# Patient Record
Sex: FEMALE | Race: WHITE | NOT HISPANIC OR LATINO | Employment: OTHER | URBAN - METROPOLITAN AREA
[De-identification: names, ages, dates, MRNs, and addresses within clinical notes are randomized per-mention and may not be internally consistent; named-entity substitution may affect disease eponyms.]

---

## 2017-07-03 ENCOUNTER — IMPORTED ENCOUNTER (OUTPATIENT)
Dept: URBAN - METROPOLITAN AREA CLINIC 50 | Facility: CLINIC | Age: 72
End: 2017-07-03

## 2017-08-23 ENCOUNTER — IMPORTED ENCOUNTER (OUTPATIENT)
Dept: URBAN - METROPOLITAN AREA CLINIC 50 | Facility: CLINIC | Age: 72
End: 2017-08-23

## 2017-09-01 ENCOUNTER — IMPORTED ENCOUNTER (OUTPATIENT)
Dept: URBAN - METROPOLITAN AREA CLINIC 50 | Facility: CLINIC | Age: 72
End: 2017-09-01

## 2017-09-08 ENCOUNTER — IMPORTED ENCOUNTER (OUTPATIENT)
Dept: URBAN - METROPOLITAN AREA CLINIC 50 | Facility: CLINIC | Age: 72
End: 2017-09-08

## 2017-09-19 ENCOUNTER — IMPORTED ENCOUNTER (OUTPATIENT)
Dept: URBAN - METROPOLITAN AREA CLINIC 50 | Facility: CLINIC | Age: 72
End: 2017-09-19

## 2017-11-15 ENCOUNTER — IMPORTED ENCOUNTER (OUTPATIENT)
Dept: URBAN - METROPOLITAN AREA CLINIC 50 | Facility: CLINIC | Age: 72
End: 2017-11-15

## 2017-12-19 ENCOUNTER — IMPORTED ENCOUNTER (OUTPATIENT)
Dept: URBAN - METROPOLITAN AREA CLINIC 50 | Facility: CLINIC | Age: 72
End: 2017-12-19

## 2018-08-06 ENCOUNTER — IMPORTED ENCOUNTER (OUTPATIENT)
Dept: URBAN - METROPOLITAN AREA CLINIC 50 | Facility: CLINIC | Age: 73
End: 2018-08-06

## 2018-08-28 ENCOUNTER — IMPORTED ENCOUNTER (OUTPATIENT)
Dept: URBAN - METROPOLITAN AREA CLINIC 50 | Facility: CLINIC | Age: 73
End: 2018-08-28

## 2018-10-11 ENCOUNTER — IMPORTED ENCOUNTER (OUTPATIENT)
Dept: URBAN - METROPOLITAN AREA CLINIC 50 | Facility: CLINIC | Age: 73
End: 2018-10-11

## 2018-11-16 ENCOUNTER — IMPORTED ENCOUNTER (OUTPATIENT)
Dept: URBAN - METROPOLITAN AREA CLINIC 50 | Facility: CLINIC | Age: 73
End: 2018-11-16

## 2018-11-19 ENCOUNTER — IMPORTED ENCOUNTER (OUTPATIENT)
Dept: URBAN - METROPOLITAN AREA CLINIC 50 | Facility: CLINIC | Age: 73
End: 2018-11-19

## 2019-08-01 ENCOUNTER — IMPORTED ENCOUNTER (OUTPATIENT)
Dept: URBAN - METROPOLITAN AREA CLINIC 50 | Facility: CLINIC | Age: 74
End: 2019-08-01

## 2019-08-14 ENCOUNTER — IMPORTED ENCOUNTER (OUTPATIENT)
Dept: URBAN - METROPOLITAN AREA CLINIC 50 | Facility: CLINIC | Age: 74
End: 2019-08-14

## 2019-09-12 ENCOUNTER — IMPORTED ENCOUNTER (OUTPATIENT)
Dept: URBAN - METROPOLITAN AREA CLINIC 50 | Facility: CLINIC | Age: 74
End: 2019-09-12

## 2019-11-11 ENCOUNTER — IMPORTED ENCOUNTER (OUTPATIENT)
Dept: URBAN - METROPOLITAN AREA CLINIC 50 | Facility: CLINIC | Age: 74
End: 2019-11-11

## 2019-12-19 ENCOUNTER — IMPORTED ENCOUNTER (OUTPATIENT)
Dept: URBAN - METROPOLITAN AREA CLINIC 50 | Facility: CLINIC | Age: 74
End: 2019-12-19

## 2020-01-28 ENCOUNTER — IMPORTED ENCOUNTER (OUTPATIENT)
Dept: URBAN - METROPOLITAN AREA CLINIC 50 | Facility: CLINIC | Age: 75
End: 2020-01-28

## 2020-09-02 ENCOUNTER — IMPORTED ENCOUNTER (OUTPATIENT)
Dept: URBAN - METROPOLITAN AREA CLINIC 50 | Facility: CLINIC | Age: 75
End: 2020-09-02

## 2020-09-02 NOTE — PATIENT DISCUSSION
"""Stop Restasis both eyes twice a day ."" ""Continue Lubricating ointment both eyes at bedtime . "" ""Increase Preservative free tears both eyes three times a day

## 2020-09-02 NOTE — PATIENT DISCUSSION
"""Continue Zaditor both eyes twice a day ."" ""Continue Warm compresses both eyes twice a day . """

## 2021-02-15 ENCOUNTER — IMPORTED ENCOUNTER (OUTPATIENT)
Dept: URBAN - METROPOLITAN AREA CLINIC 50 | Facility: CLINIC | Age: 76
End: 2021-02-15

## 2021-03-02 ENCOUNTER — IMPORTED ENCOUNTER (OUTPATIENT)
Dept: URBAN - METROPOLITAN AREA CLINIC 50 | Facility: CLINIC | Age: 76
End: 2021-03-02

## 2021-03-02 NOTE — PATIENT DISCUSSION
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 Will monitor at this time. Not visually significant at ""

## 2021-03-02 NOTE — PATIENT DISCUSSION
"""Dermatochalasis RUL bothersome to patient. Will monitor at this time. Not visually significant at ""

## 2021-04-07 ENCOUNTER — APPOINTMENT (RX ONLY)
Dept: URBAN - METROPOLITAN AREA CLINIC 57 | Facility: CLINIC | Age: 76
Setting detail: DERMATOLOGY
End: 2021-04-07

## 2021-04-07 DIAGNOSIS — Z41.9 ENCOUNTER FOR PROCEDURE FOR PURPOSES OTHER THAN REMEDYING HEALTH STATE, UNSPECIFIED: ICD-10-CM

## 2021-04-07 PROCEDURE — ? ADDITIONAL NOTES

## 2021-04-07 PROCEDURE — ? MEDICAL CONSULTATION: FILLERS

## 2021-04-07 ASSESSMENT — LOCATION DETAILED DESCRIPTION DERM: LOCATION DETAILED: RIGHT MEDIAL FOREHEAD

## 2021-04-07 ASSESSMENT — LOCATION ZONE DERM: LOCATION ZONE: FACE

## 2021-04-07 ASSESSMENT — LOCATION SIMPLE DESCRIPTION DERM: LOCATION SIMPLE: RIGHT FOREHEAD

## 2021-04-07 NOTE — PROCEDURE: MEDICAL CONSULTATION: FILLERS
Detail Level: Detailed no abdominal pain, no bloating, no constipation, no diarrhea, no nausea and no vomiting.

## 2021-04-18 ASSESSMENT — VISUAL ACUITY
OD_SC: 20/40
OS_OTHER: 20/25. 20/30.
OS_SC: 20/25
OS_SC: 20/20-1
OD_PH: 20/30
OD_SC: 20/30-
OS_BAT: 20/30
OD_CC: J1+
OD_OTHER: 20/25. 20/30.
OD_PH: 20/20
OS_SC: 20/20
OD_PH: 20/25-3
OD_CC: J1+NEAR
OD_SC: 20/30+2
OS_SC: 20/20
OD_BAT: 20/30
OS_SC: 20/30-
OD_SC: 20/40-2 BLURRY
OD_BAT: 20/25
OS_CC: J1+
OS_BAT: 20/25
OS_BAT: 20/25
OS_CC: J1+NEAR
OS_SC: 20/25
OD_SC: 20/50+
OD_SC: 20/40
OS_SC: 20/25-1
OS_BAT: 20/30
OS_OTHER: 20/25. 20/30.
OS_OTHER: 20/30. 20/30.
OS_OTHER: 20/30. 20/50.
OS_CC: J1+
OS_SC: 20/25
OS_SC: 20/25-
OD_SC: 20/40-2
OD_CC: J2@ 16 IN
OD_OTHER: 20/30. 20/50.
OD_OTHER: 20/25. 20/30.
OD_SC: 20/25
OS_SC: 20/25+
OD_SC: 20/50-2
OS_SC: 20/25 BLURRY
OS_CC: J1
OD_CC: J1+
OS_CC: J2@ 16 IN
OD_OTHER: 20/30. 20/30.
OD_SC: 20/40-1
OD_BAT: 20/25
OD_BAT: 20/30
OS_SC: 20/25
OD_CC: J1

## 2021-04-18 ASSESSMENT — TONOMETRY
OS_IOP_MMHG: 16
OS_IOP_MMHG: 17
OD_IOP_MMHG: 20
OS_IOP_MMHG: 15
OD_IOP_MMHG: 14
OD_IOP_MMHG: 15
OD_IOP_MMHG: 19
OD_IOP_MMHG: 15
OD_IOP_MMHG: 17
OS_IOP_MMHG: 18
OS_IOP_MMHG: 14
OD_IOP_MMHG: 16
OS_IOP_MMHG: 19
OD_IOP_MMHG: 17
OS_IOP_MMHG: 14
OD_IOP_MMHG: 14
OS_IOP_MMHG: 17
OD_IOP_MMHG: 16
OD_IOP_MMHG: 18
OS_IOP_MMHG: 18
OD_IOP_MMHG: 17
OS_IOP_MMHG: 18
OS_IOP_MMHG: 17
OS_IOP_MMHG: 16

## 2021-05-05 ENCOUNTER — APPOINTMENT (RX ONLY)
Dept: URBAN - METROPOLITAN AREA CLINIC 57 | Facility: CLINIC | Age: 76
Setting detail: DERMATOLOGY
End: 2021-05-05

## 2021-05-05 VITALS — TEMPERATURE: 97.6 F

## 2021-05-05 DIAGNOSIS — Z41.9 ENCOUNTER FOR PROCEDURE FOR PURPOSES OTHER THAN REMEDYING HEALTH STATE, UNSPECIFIED: ICD-10-CM

## 2021-05-05 PROCEDURE — ? ADDITIONAL NOTES

## 2021-05-05 PROCEDURE — ? COSMETIC CONSULTATION: FILLERS

## 2021-05-05 NOTE — PROCEDURE: MIPS QUALITY
Quality 130: Documentation Of Current Medications In The Medical Record: Current Medications Documented
Quality 111:Pneumonia Vaccination Status For Older Adults: Pneumococcal Vaccination Previously Received
Quality 431: Preventive Care And Screening: Unhealthy Alcohol Use - Screening: Patient screened for unhealthy alcohol use using a single question and scores less than 2 times per year
Detail Level: Detailed
Quality 47: Advance Care Plan: Advance Care Planning discussed and documented; advance care plan or surrogate decision maker documented in the medical record.
Quality 226: Preventive Care And Screening: Tobacco Use: Screening And Cessation Intervention: Patient screened for tobacco use and is an ex/non-smoker

## 2021-05-05 NOTE — PROCEDURE: ADDITIONAL NOTES
Additional Notes: Patient consent was obtained to proceed with the visit and recommended plan of care after discussion of all risks and benefits, including the risks of COVID-19 exposure.
Detail Level: Simple
Additional Notes: - patient presents for rediscussion of all cosmetic options. She was seen 4/7/2021 at which time the below were discussed with her at length. The below was again discussed with patient today and note from last visit was printed for patient today.\\n- Discussed that multimodal approach would be best to treat all of her treatment concerns.\\n- Discussed that neurotoxin such as botox could be used in the forehead to soften appearance of rhytides in this area; however discussed that given severity would help with dynamic rhytides but\\nmay not help those that are present at rest.\\n- discussed that she has significant volume loss in the mid and lower face and discussed option of filler to help restore volume such as into the cheeks and jowel; however discussed that this would not\\naddress textural changes and small caliber rhytides. Discussed that filling the medial cheek may lessen appearance of lower eyelids but that this would not decrease the volume, baginess, or\\nredundant skin of lower lids. Discussed that if treatment of lower lid baginess is desired then should discuss option of lower lid blepharoplasty with a plastic surgeon.\\n- discussed option of a treatment such as plasma pen, radiofrequency device or microneedling to address textural changes of skin and help build collagen. However discussed that given degree of changes of skin this may not be adequate to address all of her concerns. Discussed option of CO2 or Er:YAG laser which patient defers at this time. She will schedule appointment with Eloisa VARELA or  Kimberly if she would like consultation for treatment with less invasive treatment options (such as radiofrequency, microneedling or plasma pen).\\n- Patient voices understanding that individual results vary\\n-patient was informed that Botox is $15 per unit\\n-patient was quoted $800 per syringe of voluma with plan to start with 2 syringes to cheeks and jawline. She reports filler injections were discontinued early 2/2 pain in the past so will plan to allow topical anaesthetic agent to sit for 40 minutes prior to filler injection and asked that she arrive 40 minutes prior to her filler appointment time. She voices understanding.
Render Risk Assessment In Note?: no

## 2021-05-19 ENCOUNTER — APPOINTMENT (RX ONLY)
Dept: URBAN - METROPOLITAN AREA CLINIC 57 | Facility: CLINIC | Age: 76
Setting detail: DERMATOLOGY
End: 2021-05-19

## 2021-05-19 DIAGNOSIS — Z41.9 ENCOUNTER FOR PROCEDURE FOR PURPOSES OTHER THAN REMEDYING HEALTH STATE, UNSPECIFIED: ICD-10-CM

## 2021-05-19 PROCEDURE — ? ADDITIONAL NOTES

## 2021-05-19 PROCEDURE — ? JUVEDERM VOLUMA XC INJECTION

## 2021-05-19 ASSESSMENT — LOCATION DETAILED DESCRIPTION DERM
LOCATION DETAILED: RIGHT SUPERIOR LATERAL MALAR CHEEK
LOCATION DETAILED: RIGHT INFERIOR CENTRAL MALAR CHEEK
LOCATION DETAILED: RIGHT CENTRAL MANDIBULAR CHEEK
LOCATION DETAILED: RIGHT CENTRAL MALAR CHEEK
LOCATION DETAILED: LEFT CENTRAL MANDIBULAR CHEEK
LOCATION DETAILED: RIGHT CHIN
LOCATION DETAILED: LEFT CENTRAL MALAR CHEEK
LOCATION DETAILED: LEFT LATERAL MALAR CHEEK
LOCATION DETAILED: LEFT INFERIOR CENTRAL MALAR CHEEK
LOCATION DETAILED: LEFT CHIN

## 2021-05-19 ASSESSMENT — LOCATION ZONE DERM: LOCATION ZONE: FACE

## 2021-05-19 ASSESSMENT — LOCATION SIMPLE DESCRIPTION DERM
LOCATION SIMPLE: LEFT CHEEK
LOCATION SIMPLE: CHIN
LOCATION SIMPLE: RIGHT CHEEK

## 2021-05-19 NOTE — PROCEDURE: ADDITIONAL NOTES
Additional Notes: Patient consent was obtained to proceed with the visit and recommended plan of care after discussion of all risks and benefits, including the risks of COVID-19 exposure.
Detail Level: Simple
Additional Notes: - discussed that she has significant volume loss in the mid and lower face and discussed option of filler to help restore volume such as into the cheeks and jowel; however discussed that this would not\\naddress textural changes and small caliber rhytides. Discussed that filling the medial cheek may lessen appearance of lower eyelids but that this would not decrease the volume, baginess, or\\nredundant skin of lower lids. Discussed that if treatment of lower lid baginess is desired then should discuss option of lower lid blepharoplasty with a plastic surgeon.\\n- discussed option of a treatment such as plasma pen, radiofrequency device or microneedling to address textural changes of skin and help build collagen. However discussed that given degree of changes of skin this may not be adequate to address all of her concerns. Discussed option of CO2 or Er:YAG laser which patient defers at this time. She will schedule appointment with Eloisa VARELA or  Kimberly if she would like consultation for treatment with less invasive treatment options (such as radiofrequency, microneedling or plasma pen).\\n- Patient voices understanding that individual results vary\\n- Patient had 2 cc of Voluma injected into cheeks, chin, and jawline. Discussed risks at great length as below including bruising and rare but possible side effect of occlusion and skin necrosis. Discussed that if develops lace-like/reticular discoloration then should notify us immediately as this can be a sign of occlusion and emergency requiring immediate hylauronidase injection; number for our office and my cell phone provided to patient today. Recommended avoiding massage of the face or laying on face for 48 hours. Discussed to reduce swelling and bruising should ice every 1-2 hours with care not to damage skin with extreme cold. Discussed that should keep head elevated to minimize swelling. Starting Friday discussed that patient may resume normal activities.\\n- she will schedule appointment with Eloisa VARELA or Laurie  to discuss laser/device treatments as above
Render Risk Assessment In Note?: no

## 2021-06-02 ENCOUNTER — APPOINTMENT (RX ONLY)
Dept: URBAN - METROPOLITAN AREA CLINIC 57 | Facility: CLINIC | Age: 76
Setting detail: DERMATOLOGY
End: 2021-06-02

## 2021-06-02 DIAGNOSIS — Z41.9 ENCOUNTER FOR PROCEDURE FOR PURPOSES OTHER THAN REMEDYING HEALTH STATE, UNSPECIFIED: ICD-10-CM

## 2021-06-02 PROCEDURE — ? ADDITIONAL NOTES

## 2021-06-02 PROCEDURE — ? COSMETIC CONSULTATION: FILLERS

## 2021-06-02 ASSESSMENT — LOCATION DETAILED DESCRIPTION DERM
LOCATION DETAILED: LEFT CHIN
LOCATION DETAILED: RIGHT INFERIOR CENTRAL MALAR CHEEK
LOCATION DETAILED: RIGHT CENTRAL MALAR CHEEK
LOCATION DETAILED: LEFT CENTRAL MALAR CHEEK
LOCATION DETAILED: PHILTRUM
LOCATION DETAILED: LEFT INFERIOR CENTRAL MALAR CHEEK

## 2021-06-02 ASSESSMENT — LOCATION SIMPLE DESCRIPTION DERM
LOCATION SIMPLE: LEFT CHEEK
LOCATION SIMPLE: UPPER LIP
LOCATION SIMPLE: CHIN
LOCATION SIMPLE: RIGHT CHEEK

## 2021-06-02 ASSESSMENT — LOCATION ZONE DERM
LOCATION ZONE: FACE
LOCATION ZONE: LIP

## 2021-06-02 NOTE — PROCEDURE: ADDITIONAL NOTES
Render Risk Assessment In Note?: no
Additional Notes: Patient was schedule with me for a cosmetic consultation, but stated upon taking her back she was interested in PRP. Referred patient to Eloisa same day for a consult fee of $75.
Detail Level: Zone

## 2021-06-02 NOTE — PROCEDURE: ADDITIONAL NOTES
Detail Level: Detailed
Render Risk Assessment In Note?: no
Additional Notes: Educated the patient for Subnovii for around the mouth area, cost $1000\\nAlso talked to her about PRP and cost of it, $3000 for package of four treatments.\\nAlso as far as filler injection, I suggested at least the minimum of six voluma fillers injected with cannula. $5106

## 2021-06-30 ENCOUNTER — APPOINTMENT (RX ONLY)
Dept: URBAN - METROPOLITAN AREA CLINIC 57 | Facility: CLINIC | Age: 76
Setting detail: DERMATOLOGY
End: 2021-06-30

## 2021-06-30 DIAGNOSIS — Z41.9 ENCOUNTER FOR PROCEDURE FOR PURPOSES OTHER THAN REMEDYING HEALTH STATE, UNSPECIFIED: ICD-10-CM

## 2021-06-30 PROCEDURE — ? ADDITIONAL NOTES

## 2021-06-30 PROCEDURE — ? MEDICAL CONSULTATION: FILLERS

## 2021-06-30 PROCEDURE — ? FULL BODY SKIN EXAM - DECLINED

## 2021-06-30 PROCEDURE — ? PATIENT SPECIFIC COUNSELING

## 2021-06-30 PROCEDURE — ? COSMETIC CONSULTATION: PRP

## 2021-06-30 NOTE — PROCEDURE: ADDITIONAL NOTES
Render Risk Assessment In Note?: no
Detail Level: Detailed
Additional Notes: The patient is present due to concerns of laxity of jowls, hair loss, and vertical lip lines. The patient was previously consulted and is present to review her previously recommended treatment plan. The following treatment plan was presented to the patient:\\n\\nLaxity of jowl tissue: the patient was informed that she is in need of a minimum of 6 syringes of dermal fillers for treatment of her cheeks to provide a lifted appearance of her lower face. The patient was informed that the treatment method would be Cannula. The patient then inquired if this procedure would cause extensive bruising, and was informed that the use of cannula will decrease her chances of bruising. The patient then stated that she is confused as to why she was bruised with her treatment with Dr. Abreu. The patient was informed that Dr. Abreu did not perform her filler treatment with the use of cannula, therefore she experienced bruising. The patient inquired if we offered Juvederm filler. Eloisa informed the patient that we carry the Juvederm line of fillers and the options for treatment are as follows:\\nJuvederm Voluma $850/syringe with longevity of 2 years\\nJuvederm Vollure $750/syringe with longevity of 18 months\\nJuvederm Ultra $650/syringe with longevity of 12 months\\nRestylane Lyft $700/syringe with longevity of 12 months\\n\\nThe patient elected to pursue Juvederm dermal filler syringe that have a longevity of 12 months. The patient stated that her daughter had warned her against the use of Restylane due to longevity of 1 year. Eloisa then informed the patient that Restylane Lyft has a longevity of 12 months as she requested. Eloisa informed the patient that it is in her best interest to receive Restylane Lyft for treatment of her cosmetic concerns.\\n\\nThe patient inquired why there was a difference in longevity of the fillers presented. The patient was informed that the differences between the dermal fillers are the size of the particles utilized within the dermal fillers.  The patient then inquired if these particles will create an abnormal appearance beneath the skin due to size of the particles utilized. Eloisa informed the patient that the particles will not cause an abnormal appearance of her skin regardless of particle size. The patient verbalized understanding of information provided.\\nThe patient was informed that Filler alone will not resolve her jowls, and if she is in search of complete resolution she  is in need of pursuing plastic surgery. This fact was reiterated a multitude of times throughout her cosmetic consultation.\\n\\nThe patient then inquired if she is an appropriate candidate for Subnovii treatment of the perioral region. The patient was informed that she is an excellent candidate for Subnovii treatment of perioral region. The patient was quoted $1000 for perioral region. The patient was informed that she is to anticipate swelling, erythema, and scabbing of treatment region. The patient was informed that this procedure is accompanied with a 7-10 day downtime of strict avoidance of sun exposure, water based activities, and exercising.\\n\\nThe patient inquired if Subnovii will correct the appearance of her jowls, and Eloisa informed her that Subnovii will not aid in volume loss of her cheeks. The patient then inquired why Subnovii or dermal fillers will not resolve all of her cosmetic ailments as an individual treatment. The patient was informed that Subnovii and dermal fillers are two separate types of treatments to address different types of concerns. The patient was informed that dermal fillers restore volume in regions that have experienced volume loss. Subnovii is a treatment to address laxity of skin. The patient verbalized understanding of information provided. \\n\\nThe patient inquired if she is a candidate for PRP, and Eloisa informed her that she is a proper candidate for treatment. The patient was informed that she is in need of a minimum of 4 treatments performed 4 weeks apart for optimized cosmetic outcome. Eloisa informed the patient that PRP therapy is cost effective and with high patient satisfaction for treatment of androgenetic and other non-scarring alopecias. The treatment consists of collecting one’s own blood, processing it so that only the enriched cells (platelet rich plasma) is collected for procedure, and injecting it into the scalp. PRP contains essential proteins and growth factors that aid in the rejuvenation of hair follicles. Due to the natural variation of quality of PRP, results may vary based on individuals. Some individuals may require multiple treatments to obtain desired results. The patient was informed of the importance of drinking water prior to PRP treatment and was advised to eat 2 hours prior to her appointment.\\nThe patient inquired if the facial scalp procedure previously discussed for treatment of the scalp would be beneficial for her throughout her PRP treatments. Eloisa then reiterated to the patient that she was opposed to Keravive due to treatment costs of Keravive in between PRP. The patient then agreed with her original statement of Keravive being too costly in between her PRP treatments. The patient was quoted $850/single PRP treatment or $3000 for a package of 4 PRP treatments. The patient was also counseled on the use of Proscriptix shampoo, conditioner, and oral supplements. The patient then showed Eloisa a photo of her grand daughter who has experienced extensive hair loss, and inquired if PRP would be an effective treatment for her. The patient stated that her Grand daughter's father suffers from hair loss as well, and she fears that what her grand daughter is experiencing is due to genetics. Eloisa informed the patient that this question is better suited for Dr. Abreu, because Dr. Abreu specializes in hair loss due to genetics. The patient acknowledged this and stated that she will discuss this with Dr. Abreu during her next visit with her.  \\n\\nThe patient thanked Eloisa for reviewing the previous consultation with her an additional time. At the end of her consult the patient inquired if the recommended dermal filler treatment of 6 syringes will completely resolve her cosmetic concerns of jowl tissue. The patient was informed that no amount of filler will provide total resolution of her jowl tissue, and was strongly recommended to pursue cosmetic consultation with a plastic surgery to discuss surgical options in order to meet her cosmetic expectations. The patient stated that she is disappointed that fillers alone will not resolve her jowl tissue. The patient was informed that dermal filler treatments are an excellent alternative to plastic surgery, but unfortunately will not remove or lift her jowls as effectively as plastic surgery. The patient was reminded that dermal fillers are utilized for volume loss, and do not remove the laxity of her jowl tissue. The patient stated that she does not anticipate retaining any of the information discussed during todays visit due to her poor memory and requested a copy of today's visit. The patient will return to the office tomorrow 7/1 to  her visit note. \\n

## 2021-06-30 NOTE — HPI: ANDROGENIC ALOPECIA
Is This A New Presentation, Or A Follow-Up?: Hair Loss
How Did The Hair Loss Occur?: gradual in onset
How Severe Is Your Hair Loss?: mild
Previous Labs: No

## 2021-06-30 NOTE — PROCEDURE: PATIENT SPECIFIC COUNSELING
-patient reports she tolerated prior voluma injections with minimal bruising which has resolved\\n- \"after\" photographs were taken today. She reports it is difficult for her to determine if there is improvement given that she sees herself daily. Before and after photographs were reviewed with the patient today and discussed that there is improvement in volume in medial cheek which makes lower eyelid bags less noticeable and with decreased shadowing and hollowness of the medial cheek. She appreciates this on examination today.\\n- she again discussed her concerns including deep rhytides and sagging of the face. We reinforced that for significant improvement in these areas, she should consider facelift. Patient continues to defer any surgical treatment and reports she has had consultations with plastic surgeons in the past.\\n- we discussed that filler could help to address volume loss but that given her significant and severe loss and deep rhytides, large volumes would be required. Discussed that even with large volumes, her treatment goals including lifting would likely not be achieved and she would again require surgical facelift to achieve the results she is describing. She voices understanding. She will have cosmetic consultation with Eloisa to further discuss. \\n- she is also interested in possible device treatment. Previously recommended CO2 or Er:YAG laser which patient continues to defer given risks and downtime. She prefers less invasive device treatment modalities and will have a consultation with Eloisa regarding these treatment options today.\\n- patient also asks about PRP for hair loss. She will have cosmetic consultation with Eloisa to review risks, benefits, and alternatives of PRP. We did discuss variable results with PRP with a large portion of patients not having a noticeable result. She clearly voices understanding that individual results vary. Discussed option of a medical hair loss consult to determine any internal causes by reviewing bloodwork and determining need for any additional bloodwork and discussing all treatment options including medical treatment options. She will schedule hair loss consult if she is interested in this approach. She defers at this time.\\n- All patient questions answered appropriately. She is in agreement with plan.
Detail Level: Zone

## 2021-07-21 ENCOUNTER — APPOINTMENT (RX ONLY)
Dept: URBAN - METROPOLITAN AREA CLINIC 57 | Facility: CLINIC | Age: 76
Setting detail: DERMATOLOGY
End: 2021-07-21

## 2021-07-21 DIAGNOSIS — L65.9 NONSCARRING HAIR LOSS, UNSPECIFIED: ICD-10-CM

## 2021-07-21 PROCEDURE — ? ADDITIONAL NOTES

## 2021-07-21 PROCEDURE — ? PLATELET RICH PLASMA INJECTION

## 2021-07-21 ASSESSMENT — LOCATION SIMPLE DESCRIPTION DERM
LOCATION SIMPLE: LEFT SCALP
LOCATION SIMPLE: RIGHT SCALP

## 2021-07-21 ASSESSMENT — LOCATION DETAILED DESCRIPTION DERM
LOCATION DETAILED: RIGHT PARIETAL SCALP
LOCATION DETAILED: LEFT PARIETAL SCALP
LOCATION DETAILED: LEFT FRONTAL SCALP
LOCATION DETAILED: RIGHT FRONTAL SCALP

## 2021-07-21 ASSESSMENT — LOCATION ZONE DERM: LOCATION ZONE: SCALP

## 2021-07-21 NOTE — PROCEDURE: PLATELET RICH PLASMA INJECTION
Price (Use Numbers Only, No Special Characters Or $): 400 Price (Use Numbers Only, No Special Characters Or $): 104

## 2021-07-21 NOTE — PROCEDURE: ADDITIONAL NOTES
Detail Level: Detailed
Render Risk Assessment In Note?: no
Additional Notes: Using the correct shampoo in conjunction with the PRP treatments patient will receive optimal results.\\nLaser hair cap would use it 6 minutes at bedtime to help maintain results. Eloisa suggested she can $3500 for Laser hair cap. Since she had PRP done $500 discount. \\n\\nPROscriptix shampoo and conditioner volumizing and vitamins to use. \\nShe can continue using Rogain while PRP treatments.\\nPatient wanted to know if she can use hair styling products. Eloisa stated she treats the scalp not hair.

## 2021-08-26 ENCOUNTER — APPOINTMENT (RX ONLY)
Dept: URBAN - METROPOLITAN AREA CLINIC 56 | Facility: CLINIC | Age: 76
Setting detail: DERMATOLOGY
End: 2021-08-26

## 2021-08-26 DIAGNOSIS — L90.8 OTHER ATROPHIC DISORDERS OF SKIN: ICD-10-CM

## 2021-08-26 DIAGNOSIS — L57.8 OTHER SKIN CHANGES DUE TO CHRONIC EXPOSURE TO NONIONIZING RADIATION: ICD-10-CM

## 2021-08-26 PROCEDURE — ? RECOMMENDATIONS

## 2021-08-26 PROCEDURE — ? ADDITIONAL NOTES

## 2021-08-26 PROCEDURE — ? PRESCRIPTION

## 2021-08-26 PROCEDURE — ? COUNSELING

## 2021-08-26 PROCEDURE — 99213 OFFICE O/P EST LOW 20 MIN: CPT

## 2021-08-26 RX ORDER — TRETIONIN 0.5 MG/G
CREAM TOPICAL QHS
Qty: 1 | Refills: 2 | Status: ACTIVE

## 2021-08-26 RX ORDER — TRETIONIN 0.5 MG/G
CREAM TOPICAL QHS
Qty: 1 | Refills: 2 | Status: ERX | COMMUNITY
Start: 2021-08-26

## 2021-08-26 RX ADMIN — TRETIONIN: 0.5 CREAM TOPICAL at 00:00

## 2021-08-26 ASSESSMENT — LOCATION DETAILED DESCRIPTION DERM
LOCATION DETAILED: RIGHT LOWER CUTANEOUS LIP
LOCATION DETAILED: PHILTRUM

## 2021-08-26 ASSESSMENT — LOCATION SIMPLE DESCRIPTION DERM
LOCATION SIMPLE: UPPER LIP
LOCATION SIMPLE: RIGHT LIP

## 2021-08-26 ASSESSMENT — LOCATION ZONE DERM: LOCATION ZONE: LIP

## 2021-08-26 NOTE — PROCEDURE: RECOMMENDATIONS
Render Risk Assessment In Note?: no
Detail Level: Zone
Recommendation Preamble: The following recommendations were made during the visit: CO2 laser

## 2021-08-26 NOTE — PROCEDURE: ADDITIONAL NOTES
Render Risk Assessment In Note?: no
Additional Notes: Rec CO2 laser (or RF, US discussed)
Detail Level: Simple

## 2021-08-27 ENCOUNTER — PREPPED CHART (OUTPATIENT)
Dept: URBAN - METROPOLITAN AREA CLINIC 49 | Facility: CLINIC | Age: 76
End: 2021-08-27

## 2021-09-01 ENCOUNTER — APPOINTMENT (RX ONLY)
Dept: URBAN - METROPOLITAN AREA CLINIC 57 | Facility: CLINIC | Age: 76
Setting detail: DERMATOLOGY
End: 2021-09-01

## 2021-09-01 DIAGNOSIS — L65.9 NONSCARRING HAIR LOSS, UNSPECIFIED: ICD-10-CM

## 2021-09-01 PROCEDURE — ? PLATELET RICH PLASMA INJECTION

## 2021-09-01 ASSESSMENT — LOCATION SIMPLE DESCRIPTION DERM
LOCATION SIMPLE: LEFT SCALP
LOCATION SIMPLE: SCALP
LOCATION SIMPLE: RIGHT FOREHEAD
LOCATION SIMPLE: LEFT FOREHEAD
LOCATION SIMPLE: RIGHT SCALP

## 2021-09-01 ASSESSMENT — LOCATION DETAILED DESCRIPTION DERM
LOCATION DETAILED: LEFT CENTRAL PARIETAL SCALP
LOCATION DETAILED: RIGHT SUPERIOR MEDIAL FOREHEAD
LOCATION DETAILED: RIGHT SUPERIOR PARIETAL SCALP
LOCATION DETAILED: RIGHT SUPERIOR FOREHEAD
LOCATION DETAILED: LEFT SUPERIOR FOREHEAD
LOCATION DETAILED: RIGHT CENTRAL FRONTAL SCALP
LOCATION DETAILED: LEFT MEDIAL FRONTAL SCALP
LOCATION DETAILED: LEFT SUPERIOR PARIETAL SCALP
LOCATION DETAILED: LEFT CENTRAL FRONTAL SCALP
LOCATION DETAILED: LEFT FOREHEAD

## 2021-09-01 ASSESSMENT — LOCATION ZONE DERM
LOCATION ZONE: FACE
LOCATION ZONE: SCALP

## 2021-09-01 NOTE — PROCEDURE: PLATELET RICH PLASMA INJECTION
Price (Use Numbers Only, No Special Characters Or $): 099 Price (Use Numbers Only, No Special Characters Or $): 487

## 2021-09-22 ENCOUNTER — APPOINTMENT (RX ONLY)
Dept: URBAN - METROPOLITAN AREA CLINIC 57 | Facility: CLINIC | Age: 76
Setting detail: DERMATOLOGY
End: 2021-09-22

## 2021-09-22 DIAGNOSIS — Z41.9 ENCOUNTER FOR PROCEDURE FOR PURPOSES OTHER THAN REMEDYING HEALTH STATE, UNSPECIFIED: ICD-10-CM

## 2021-09-22 PROCEDURE — ? ADDITIONAL NOTES

## 2021-09-22 PROCEDURE — ? BOTOX

## 2021-09-22 ASSESSMENT — LOCATION DETAILED DESCRIPTION DERM
LOCATION DETAILED: LEFT MEDIAL EYEBROW
LOCATION DETAILED: RIGHT CENTRAL EYEBROW
LOCATION DETAILED: RIGHT INFERIOR TEMPLE
LOCATION DETAILED: RIGHT MEDIAL EYEBROW
LOCATION DETAILED: RIGHT FOREHEAD
LOCATION DETAILED: LEFT MID TEMPLE
LOCATION DETAILED: LEFT FOREHEAD
LOCATION DETAILED: GLABELLA
LOCATION DETAILED: RIGHT MEDIAL FOREHEAD
LOCATION DETAILED: LEFT SUPERIOR LATERAL MALAR CHEEK
LOCATION DETAILED: RIGHT LATERAL FOREHEAD
LOCATION DETAILED: RIGHT MID TEMPLE
LOCATION DETAILED: LEFT CENTRAL EYEBROW
LOCATION DETAILED: RIGHT SUPERIOR CENTRAL MALAR CHEEK
LOCATION DETAILED: LEFT SUPERIOR CENTRAL MALAR CHEEK

## 2021-09-22 ASSESSMENT — LOCATION ZONE DERM: LOCATION ZONE: FACE

## 2021-09-22 ASSESSMENT — LOCATION SIMPLE DESCRIPTION DERM
LOCATION SIMPLE: RIGHT CHEEK
LOCATION SIMPLE: LEFT CHEEK
LOCATION SIMPLE: LEFT EYEBROW
LOCATION SIMPLE: LEFT TEMPLE
LOCATION SIMPLE: RIGHT TEMPLE
LOCATION SIMPLE: RIGHT EYEBROW
LOCATION SIMPLE: GLABELLA
LOCATION SIMPLE: RIGHT FOREHEAD
LOCATION SIMPLE: LEFT FOREHEAD

## 2021-09-22 NOTE — PROCEDURE: BOTOX
Price (Use Numbers Only, No Special Characters Or $): 266 Price (Use Numbers Only, No Special Characters Or $): 509

## 2021-09-22 NOTE — PROCEDURE: ADDITIONAL NOTES
Additional Notes: Patient consent was obtained to proceed with the visit and recommended plan of care after discussion of all risks and benefits, including the risks of COVID-19 exposure.
Detail Level: Simple
Render Risk Assessment In Note?: no
Detail Level: Detailed
Additional Notes: Discussed with patient that her expectations of lifting her loose skin would only be obtainable with a face lift.  Discussed putting fillers in her cheeks that would fill them out.  Discussed putting fillers around her mouth in the lines that would smooth the area out.

## 2021-09-23 ENCOUNTER — ANNUAL COMPREHENSIVE EXAM (OUTPATIENT)
Dept: URBAN - METROPOLITAN AREA CLINIC 49 | Facility: CLINIC | Age: 76
End: 2021-09-23

## 2021-09-23 DIAGNOSIS — H43.813: ICD-10-CM

## 2021-09-23 DIAGNOSIS — H26.493: ICD-10-CM

## 2021-09-23 DIAGNOSIS — H35.361: ICD-10-CM

## 2021-09-23 PROCEDURE — 92015 DETERMINE REFRACTIVE STATE: CPT

## 2021-09-23 PROCEDURE — 92014 COMPRE OPH EXAM EST PT 1/>: CPT

## 2021-09-23 ASSESSMENT — VISUAL ACUITY
OS_SC: 20/25
OD_SC: 20/30+2
OU_CC: J1+

## 2021-09-23 ASSESSMENT — TONOMETRY
OD_IOP_MMHG: 16
OS_IOP_MMHG: 16

## 2021-09-29 ENCOUNTER — APPOINTMENT (RX ONLY)
Dept: URBAN - METROPOLITAN AREA CLINIC 57 | Facility: CLINIC | Age: 76
Setting detail: DERMATOLOGY
End: 2021-09-29

## 2021-09-29 DIAGNOSIS — L65.9 NONSCARRING HAIR LOSS, UNSPECIFIED: ICD-10-CM

## 2021-09-29 PROCEDURE — ? PLATELET RICH PLASMA INJECTION

## 2021-09-29 ASSESSMENT — LOCATION SIMPLE DESCRIPTION DERM
LOCATION SIMPLE: RIGHT SCALP
LOCATION SIMPLE: LEFT SCALP
LOCATION SIMPLE: LEFT FOREHEAD
LOCATION SIMPLE: RIGHT FOREHEAD

## 2021-09-29 ASSESSMENT — LOCATION ZONE DERM
LOCATION ZONE: SCALP
LOCATION ZONE: FACE

## 2021-09-29 ASSESSMENT — LOCATION DETAILED DESCRIPTION DERM
LOCATION DETAILED: LEFT SUPERIOR FOREHEAD
LOCATION DETAILED: RIGHT SUPERIOR FOREHEAD
LOCATION DETAILED: RIGHT CENTRAL FRONTAL SCALP
LOCATION DETAILED: LEFT CENTRAL FRONTAL SCALP
LOCATION DETAILED: LEFT MEDIAL FRONTAL SCALP

## 2021-09-29 NOTE — PROCEDURE: PLATELET RICH PLASMA INJECTION
Price (Use Numbers Only, No Special Characters Or $): 290 Price (Use Numbers Only, No Special Characters Or $): 009

## 2021-11-03 ENCOUNTER — APPOINTMENT (RX ONLY)
Dept: URBAN - METROPOLITAN AREA CLINIC 57 | Facility: CLINIC | Age: 76
Setting detail: DERMATOLOGY
End: 2021-11-03

## 2021-11-03 DIAGNOSIS — L65.9 NONSCARRING HAIR LOSS, UNSPECIFIED: ICD-10-CM

## 2021-11-03 PROCEDURE — ? PLATELET RICH PLASMA INJECTION

## 2021-11-03 ASSESSMENT — LOCATION SIMPLE DESCRIPTION DERM: LOCATION SIMPLE: LEFT SCALP

## 2021-11-03 ASSESSMENT — LOCATION DETAILED DESCRIPTION DERM: LOCATION DETAILED: LEFT MEDIAL FRONTAL SCALP

## 2021-11-03 ASSESSMENT — LOCATION ZONE DERM: LOCATION ZONE: SCALP

## 2021-11-03 NOTE — PROCEDURE: PLATELET RICH PLASMA INJECTION
Price (Use Numbers Only, No Special Characters Or $): 517 Price (Use Numbers Only, No Special Characters Or $): 578

## 2021-11-03 NOTE — HPI: HAIR LOSS
How Did The Hair Loss Occur?: gradual in onset
How Severe Is Your Hair Loss?: mild
Additional History: Pt is getting. PRP treatment done on her last treatment.

## 2021-11-03 NOTE — PROCEDURE: PLATELET RICH PLASMA INJECTION
Discharge home.  Azithromycin as directed.  Have someone else  this prescription for you and delivered to your house.  Take 2 extra Tylenols every 6 hours as needed for fever or pain.  Robitussin-DM as needed for cough.  Isolate yourself at home until results of testing are known.  Drink plenty of fluids and rest.  Return to ER for severe worsening chest pain or shortness of breath passing out confusion multiple episodes of vomiting or any new or worsening symptoms   Amount Of Blood Processed (Optional - Include Units): 18cc

## 2022-04-01 ENCOUNTER — COMPREHENSIVE EXAM (OUTPATIENT)
Dept: URBAN - METROPOLITAN AREA CLINIC 53 | Facility: CLINIC | Age: 77
End: 2022-04-01

## 2022-04-01 DIAGNOSIS — H04.123: ICD-10-CM

## 2022-04-01 PROCEDURE — 92012 INTRM OPH EXAM EST PATIENT: CPT

## 2022-04-01 ASSESSMENT — VISUAL ACUITY
OS_SC: 20/25-1
OD_SC: 20/30-1
OD_PH: 20/25+2
OU_SC: 20/25-1

## 2022-04-01 ASSESSMENT — TONOMETRY
OS_IOP_MMHG: 16
OD_IOP_MMHG: 16

## 2022-04-01 NOTE — PATIENT DISCUSSION
Recommended that she start using Systane Hydration PF, provided sample to her. Discussed that she does not produce enough tears to keep her eye lubricated. She can continue the night time ointment she is using. Also advised the warm compresses. If this does not help her at all she is to call on Monday or Tuesday and we will devise a new plan.

## 2022-04-01 NOTE — PATIENT DISCUSSION
Advised against waterproof kenzie. Reassured her she does not have to throw out her make up except for every 6 months.

## 2022-04-19 ENCOUNTER — ESTABLISHED PATIENT (OUTPATIENT)
Dept: URBAN - METROPOLITAN AREA CLINIC 53 | Facility: CLINIC | Age: 77
End: 2022-04-19

## 2022-04-19 DIAGNOSIS — L71.9: ICD-10-CM

## 2022-04-19 PROCEDURE — 92012 INTRM OPH EXAM EST PATIENT: CPT

## 2022-04-19 ASSESSMENT — VISUAL ACUITY
OS_SC: 20/25
OD_SC: 20/25

## 2022-04-19 ASSESSMENT — TONOMETRY
OD_IOP_MMHG: 19
OS_IOP_MMHG: 19

## 2022-04-19 NOTE — PATIENT DISCUSSION
Discussed with patient,  recommend patient start maxitrol ointment at bedtime on both lids. and switch artificial tears to thera tears 3 times a day cold from the fridge.

## 2022-05-17 ENCOUNTER — FOLLOW UP (OUTPATIENT)
Dept: URBAN - METROPOLITAN AREA CLINIC 53 | Facility: CLINIC | Age: 77
End: 2022-05-17

## 2022-05-17 DIAGNOSIS — L71.9: ICD-10-CM

## 2022-05-17 PROCEDURE — 92012 INTRM OPH EXAM EST PATIENT: CPT

## 2022-05-17 ASSESSMENT — VISUAL ACUITY
OD_SC: 20/30
OS_SC: 20/25

## 2022-05-17 ASSESSMENT — TONOMETRY
OS_IOP_MMHG: 18
OD_IOP_MMHG: 17

## 2022-05-17 NOTE — PATIENT DISCUSSION
Advised against waterproof knezie. Reassured her she does not have to throw out her make up except for every 6 months.

## 2022-05-17 NOTE — PATIENT DISCUSSION
Discussed with patient, recommend patient increase Maxitrol ointment to BID on both lids. and continue artificial tears TID OU cold from the refrigerator. Patient can d/c Maxitrol in 4 months.

## 2022-06-09 ENCOUNTER — APPOINTMENT (RX ONLY)
Dept: URBAN - METROPOLITAN AREA CLINIC 342 | Facility: CLINIC | Age: 77
Setting detail: DERMATOLOGY
End: 2022-06-09

## 2022-06-09 DIAGNOSIS — D18.0 HEMANGIOMA: ICD-10-CM | Status: STABLE

## 2022-06-09 DIAGNOSIS — L82.1 OTHER SEBORRHEIC KERATOSIS: ICD-10-CM | Status: STABLE

## 2022-06-09 DIAGNOSIS — Z85.820 PERSONAL HISTORY OF MALIGNANT MELANOMA OF SKIN: ICD-10-CM | Status: STABLE

## 2022-06-09 DIAGNOSIS — D22 MELANOCYTIC NEVI: ICD-10-CM | Status: STABLE

## 2022-06-09 DIAGNOSIS — M67.4 GANGLION: ICD-10-CM | Status: STABLE

## 2022-06-09 DIAGNOSIS — L81.4 OTHER MELANIN HYPERPIGMENTATION: ICD-10-CM | Status: STABLE

## 2022-06-09 PROBLEM — M67.431 GANGLION, RIGHT WRIST: Status: ACTIVE | Noted: 2022-06-09

## 2022-06-09 PROBLEM — D22.9 MELANOCYTIC NEVI, UNSPECIFIED: Status: ACTIVE | Noted: 2022-06-09

## 2022-06-09 PROBLEM — D18.01 HEMANGIOMA OF SKIN AND SUBCUTANEOUS TISSUE: Status: ACTIVE | Noted: 2022-06-09

## 2022-06-09 PROCEDURE — 99213 OFFICE O/P EST LOW 20 MIN: CPT

## 2022-06-09 PROCEDURE — ? ADDITIONAL NOTES

## 2022-06-09 PROCEDURE — ? COUNSELING

## 2022-06-09 PROCEDURE — ? FULL BODY SKIN EXAM

## 2022-06-09 ASSESSMENT — LOCATION DETAILED DESCRIPTION DERM
LOCATION DETAILED: LEFT PROXIMAL DORSAL FOREARM
LOCATION DETAILED: SUPERIOR THORACIC SPINE
LOCATION DETAILED: RIGHT VENTRAL WRIST
LOCATION DETAILED: RIGHT INFERIOR UPPER BACK
LOCATION DETAILED: UPPER STERNUM
LOCATION DETAILED: EPIGASTRIC SKIN
LOCATION DETAILED: LEFT POSTERIOR SHOULDER
LOCATION DETAILED: RIGHT POSTERIOR SHOULDER
LOCATION DETAILED: RIGHT PROXIMAL DORSAL FOREARM
LOCATION DETAILED: RIGHT INFERIOR MEDIAL FOREHEAD

## 2022-06-09 ASSESSMENT — LOCATION SIMPLE DESCRIPTION DERM
LOCATION SIMPLE: CHEST
LOCATION SIMPLE: ABDOMEN
LOCATION SIMPLE: RIGHT WRIST
LOCATION SIMPLE: UPPER BACK
LOCATION SIMPLE: RIGHT FOREHEAD
LOCATION SIMPLE: LEFT SHOULDER
LOCATION SIMPLE: RIGHT UPPER BACK
LOCATION SIMPLE: RIGHT SHOULDER
LOCATION SIMPLE: LEFT FOREARM
LOCATION SIMPLE: RIGHT FOREARM

## 2022-06-09 ASSESSMENT — LOCATION ZONE DERM
LOCATION ZONE: FACE
LOCATION ZONE: TRUNK
LOCATION ZONE: ARM

## 2022-06-09 NOTE — HPI: EVALUATION OF SKIN LESION(S)
Hpi Title: Evaluation of Skin Lesions
How Severe Are Your Spot(S)?: mild
Have Your Spot(S) Been Treated In The Past?: has not been treated
Location: Right forearm
Year Removed: 2014

## 2022-06-14 ENCOUNTER — FOLLOW UP (OUTPATIENT)
Dept: URBAN - METROPOLITAN AREA CLINIC 53 | Facility: CLINIC | Age: 77
End: 2022-06-14

## 2022-06-14 DIAGNOSIS — L71.9: ICD-10-CM

## 2022-06-14 PROCEDURE — 92012 INTRM OPH EXAM EST PATIENT: CPT

## 2022-06-14 ASSESSMENT — TONOMETRY
OD_IOP_MMHG: 19
OS_IOP_MMHG: 19

## 2022-06-14 ASSESSMENT — VISUAL ACUITY
OD_PH: 20/25-1
OS_SC: 20/25
OD_SC: 20/40+1

## 2022-06-14 NOTE — PATIENT DISCUSSION
Patient was concerned about losing lashes, explained to the patient that the rosacea will do that but once things get under control with medication, it should go back to normal. If not, we discussed Latisse.

## 2022-06-14 NOTE — PATIENT DISCUSSION
Recommended that she start using Systane Hydration PF, provided sample to her. Discussed that she does not produce enough tears to keep her eye lubricated. She can continue the night time ointment she is using. Also advised the warm compresses. If this does not help her at all she is to call on Monday or Tuesday and we will devise a new plan. 21-May-2019 11:16

## 2022-06-14 NOTE — PATIENT DISCUSSION
4+++ lengthy discussion with patient, discussed with pt that this is a life long diagnosis. Pt can use Maxitrol as needed for flare ups.

## 2022-08-25 ENCOUNTER — APPOINTMENT (RX ONLY)
Dept: URBAN - METROPOLITAN AREA CLINIC 342 | Facility: CLINIC | Age: 77
Setting detail: DERMATOLOGY
End: 2022-08-25

## 2022-08-25 DIAGNOSIS — Z41.9 ENCOUNTER FOR PROCEDURE FOR PURPOSES OTHER THAN REMEDYING HEALTH STATE, UNSPECIFIED: ICD-10-CM

## 2022-08-25 PROCEDURE — ? COSMETIC CONSULTATION: FILLERS

## 2022-08-25 PROCEDURE — ? ADDITIONAL NOTES

## 2022-08-25 PROCEDURE — ? COSMETIC CONSULTATION: LASER RESURFACING

## 2022-08-25 PROCEDURE — ? COSMETIC CONSULTATION: PLASMA PEN

## 2022-08-25 ASSESSMENT — LOCATION ZONE DERM: LOCATION ZONE: FACE

## 2022-08-25 ASSESSMENT — LOCATION DETAILED DESCRIPTION DERM
LOCATION DETAILED: LEFT INFERIOR CENTRAL MALAR CHEEK
LOCATION DETAILED: RIGHT INFERIOR MEDIAL MALAR CHEEK
LOCATION DETAILED: LEFT INFERIOR MEDIAL MALAR CHEEK
LOCATION DETAILED: LEFT CHIN
LOCATION DETAILED: INFERIOR MID FOREHEAD
LOCATION DETAILED: RIGHT INFERIOR CENTRAL MALAR CHEEK
LOCATION DETAILED: RIGHT CHIN

## 2022-08-25 ASSESSMENT — LOCATION SIMPLE DESCRIPTION DERM
LOCATION SIMPLE: LEFT CHEEK
LOCATION SIMPLE: INFERIOR FOREHEAD
LOCATION SIMPLE: CHIN
LOCATION SIMPLE: RIGHT CHEEK

## 2022-08-25 NOTE — PROCEDURE: ADDITIONAL NOTES
Additional Notes: Patient states that she has had fillers and Botox in the past. She feels her left side has more wrinkles that she is worried about. Gave information on subnovii, fillers, and venus viva. Gave patient the quote but also advised patient that sometimes she can need more or less syringes so the price can change. Patient stated she understood.
Detail Level: Detailed
Render Risk Assessment In Note?: no

## 2022-08-26 ENCOUNTER — ESTABLISHED PATIENT (OUTPATIENT)
Dept: URBAN - METROPOLITAN AREA CLINIC 48 | Facility: LOCATION | Age: 77
End: 2022-08-26

## 2022-08-26 DIAGNOSIS — H16.223: ICD-10-CM

## 2022-08-26 DIAGNOSIS — H26.493: ICD-10-CM

## 2022-08-26 DIAGNOSIS — H35.363: ICD-10-CM

## 2022-08-26 DIAGNOSIS — H43.813: ICD-10-CM

## 2022-08-26 PROCEDURE — 92014 COMPRE OPH EXAM EST PT 1/>: CPT

## 2022-08-26 ASSESSMENT — TONOMETRY
OS_IOP_MMHG: 18
OD_IOP_MMHG: 18

## 2022-08-26 ASSESSMENT — VISUAL ACUITY
OS_SC: 20/25
OD_SC: 20/25

## 2022-08-26 NOTE — PATIENT DISCUSSION
Patient continuously asked the same questions multiple times and details were explained to patient multiple times.

## 2022-08-26 NOTE — PATIENT DISCUSSION
Patient woke up today with pressure feeling along frontal and maxillary sinuses. Recommended patient take OTC Benadryl or Zyrtec PO Qday for at least a week to see if it takes care of pressure.

## 2022-08-26 NOTE — PATIENT DISCUSSION
Previous Notes: Advised against waterproof mascara. Reassured her she does not have to throw out her make up except for every 6 months.

## 2022-08-26 NOTE — PATIENT DISCUSSION
Headache along entire head and neck (pressure feeling) x 2-3 weeks. Longstanding h/o migraines. Patient takes Imitrex. Patient to f/u with PCP.

## 2022-08-26 NOTE — PATIENT DISCUSSION
Previous Notes: Recommended that she start using Systane Hydration PF, provided sample to her. Discussed that she does not produce enough tears to keep her eye lubricated. She can continue the night time ointment she is using. Also advised the warm compresses. If this does not help her at all she is to call on Monday or Tuesday and we will devise a new plan.

## 2022-08-26 NOTE — PATIENT DISCUSSION
Patient inquired about Latisse but advised patient against using it at the moment as it could cause irritation.

## 2022-10-13 ENCOUNTER — FOLLOW UP (OUTPATIENT)
Dept: URBAN - METROPOLITAN AREA CLINIC 49 | Facility: CLINIC | Age: 77
End: 2022-10-13

## 2022-10-13 DIAGNOSIS — H26.493: ICD-10-CM

## 2022-10-13 DIAGNOSIS — L71.9: ICD-10-CM

## 2022-10-13 PROCEDURE — 92012 INTRM OPH EXAM EST PATIENT: CPT

## 2022-10-13 ASSESSMENT — VISUAL ACUITY
OS_SC: 20/25-2
OD_PH: 20/25+2
OS_PH: 20/25+2
OD_SC: 20/30+2

## 2022-10-13 ASSESSMENT — TONOMETRY
OS_IOP_MMHG: 18
OD_IOP_MMHG: 18

## 2022-10-13 NOTE — PATIENT DISCUSSION
No additional findings today, patient's current treatment seems to be working. Patient to continue current treatment, and start Dry eye vitamin. (Hydroeye) Qday.  Will see patient 1 year from her previous yearly exam.

## 2023-04-11 ENCOUNTER — EXTERNAL LAB (OUTPATIENT)
Dept: HEALTH INFORMATION MANAGEMENT | Age: 78
End: 2023-04-11

## 2023-04-11 LAB
ALBUMIN SERPL-MCNC: 4.1 G/DL (ref 3.2–5.2)
ALBUMIN/GLOB SERPL: 1.4 CALC (ref 1.1–2.5)
ALP SERPL-CCNC: 59 U/L (ref 39–147)
ALT SERPL-CCNC: 14 U/L (ref 0–31)
AST SERPL-CCNC: 18 U/L (ref 0–31)
BASOPHILS # BLD: 0.04 X10^3/UL (ref 0–0.2)
BASOPHILS NFR BLD: 0.9 % (ref 0–2)
BILIRUB SERPL-MCNC: 0.3 MG/DL (ref 0–1)
BUN SERPL-MCNC: 16 MG/DL (ref 6–23)
BUN/CREAT SERPL: 18 CALC (ref 10–20)
CALCIUM SERPL-MCNC: 9.7 MG/DL (ref 8.4–10.6)
CHLORIDE SERPL-SCNC: 100 MEQ/L (ref 98–108)
CO2 SERPL-SCNC: 25 MEQ/L (ref 22–33)
CREAT SERPL-MCNC: 0.9 MG/DL (ref 0.3–1.2)
EGFRCR SERPLBLD CKD-EPI 2021: 61.67 ML/MIN (ref 60–200)
EGFRCR SERPLBLD CKD-EPI 2021: 71.48 ML/MIN (ref 60–200)
EOSINOPHIL # BLD: 0.13 X10^3/UL (ref 0–0.6)
EOSINOPHIL NFR BLD: 2.8 % (ref 0–6)
ERYTHROCYTE [DISTWIDTH] IN BLOOD: 14.8 % (ref 11.5–15)
GLOBULIN SER-MCNC: 2.9 G/DL (ref 2–3.5)
GLUCOSE SERPL-MCNC: 95 MG/DL (ref 70–105)
HCT VFR BLD CALC: 34.5 % (ref 37–47)
HGB BLD-MCNC: 11.2 G/DL (ref 12–16)
IMM GRANULOCYTES # BLD: 0 X10^3/UL (ref 0–0.3)
IMM GRANULOCYTES NFR BLD: 0 % (ref 0–0.5)
LAB RESULT: NORMAL
LENGTH OF FAST TIME PATIENT: NORMAL H
LYMPHOCYTES # BLD: 1.25 X10^3/UL (ref 1.2–3.4)
LYMPHOCYTES NFR BLD: 27.4 % (ref 15–40)
MCH RBC QN AUTO: 30.9 PG (ref 27–34)
MCHC RBC AUTO-ENTMCNC: 32.5 % (ref 32–35.9)
MCV RBC AUTO: 95 FL (ref 80–95)
MONOCYTES # BLD: 0.33 X10^3/UL (ref 0–0.9)
MONOCYTES NFR BLD: 7.2 % (ref 0–10)
NEUTROPHILS # BLD: 2.84 X10^3/UL (ref 1.5–6.5)
NEUTROPHILS NFR BLD: 61.7 % (ref 39–78)
PLATELET # BLD: 266 X10^3/UL (ref 140–440)
PMV BLD AUTO: 8 FL (ref 7.5–11.5)
POTASSIUM SERPL-SCNC: 4.3 MEQ/L (ref 3.3–5.1)
PROT SERPL-MCNC: 7 G/DL (ref 5.9–8.4)
RBC # BLD: 3.63 X10^6/UL (ref 3.8–5.4)
SODIUM SERPL-SCNC: 134 MEQ/L (ref 133–145)
WBC # BLD: 4.6 X10^3/UL (ref 4.2–10)

## 2023-04-18 ENCOUNTER — EXTERNAL RECORD (OUTPATIENT)
Dept: OTHER | Age: 78
End: 2023-04-18

## 2023-06-28 ENCOUNTER — ESTABLISHED PATIENT (OUTPATIENT)
Dept: URBAN - METROPOLITAN AREA CLINIC 49 | Facility: CLINIC | Age: 78
End: 2023-06-28

## 2023-06-28 DIAGNOSIS — H40.013: ICD-10-CM

## 2023-06-28 DIAGNOSIS — H20.00: ICD-10-CM

## 2023-06-28 DIAGNOSIS — J32.2: ICD-10-CM

## 2023-06-28 PROCEDURE — 92012 INTRM OPH EXAM EST PATIENT: CPT

## 2023-06-28 RX ORDER — TOBRAMYCIN AND DEXAMETHASONE 1; 3 MG/ML; MG/ML
1 SUSPENSION/ DROPS OPHTHALMIC
Start: 2023-06-28 | End: 2023-07-05

## 2023-06-28 ASSESSMENT — TONOMETRY
OS_IOP_MMHG: 19
OD_IOP_MMHG: 18

## 2023-06-28 ASSESSMENT — VISUAL ACUITY
OD_SC: 20/30
OS_SC: 20/30

## 2023-06-29 ENCOUNTER — DIAGNOSTICS ONLY (OUTPATIENT)
Dept: URBAN - METROPOLITAN AREA CLINIC 49 | Facility: CLINIC | Age: 78
End: 2023-06-29

## 2023-06-29 DIAGNOSIS — H40.013: ICD-10-CM

## 2023-06-29 PROCEDURE — 92083 EXTENDED VISUAL FIELD XM: CPT

## 2023-06-29 PROCEDURE — 92133 CPTRZD OPH DX IMG PST SGM ON: CPT

## 2023-07-20 ENCOUNTER — APPOINTMENT (RX ONLY)
Dept: URBAN - METROPOLITAN AREA CLINIC 342 | Facility: CLINIC | Age: 78
Setting detail: DERMATOLOGY
End: 2023-07-20

## 2023-07-20 DIAGNOSIS — D18.0 HEMANGIOMA: ICD-10-CM | Status: STABLE

## 2023-07-20 DIAGNOSIS — M67.4 GANGLION: ICD-10-CM

## 2023-07-20 DIAGNOSIS — D22 MELANOCYTIC NEVI: ICD-10-CM | Status: STABLE

## 2023-07-20 DIAGNOSIS — L81.4 OTHER MELANIN HYPERPIGMENTATION: ICD-10-CM | Status: STABLE

## 2023-07-20 DIAGNOSIS — Z85.820 PERSONAL HISTORY OF MALIGNANT MELANOMA OF SKIN: ICD-10-CM

## 2023-07-20 DIAGNOSIS — L82.1 OTHER SEBORRHEIC KERATOSIS: ICD-10-CM | Status: STABLE

## 2023-07-20 PROBLEM — D22.9 MELANOCYTIC NEVI, UNSPECIFIED: Status: ACTIVE | Noted: 2023-07-20

## 2023-07-20 PROBLEM — D18.01 HEMANGIOMA OF SKIN AND SUBCUTANEOUS TISSUE: Status: ACTIVE | Noted: 2023-07-20

## 2023-07-20 PROBLEM — M67.431 GANGLION, RIGHT WRIST: Status: ACTIVE | Noted: 2023-07-20

## 2023-07-20 PROBLEM — M67.442 GANGLION, LEFT HAND: Status: ACTIVE | Noted: 2023-07-20

## 2023-07-20 PROCEDURE — ? COUNSELING

## 2023-07-20 PROCEDURE — ? FULL BODY SKIN EXAM

## 2023-07-20 PROCEDURE — ? TREATMENT REGIMEN

## 2023-07-20 PROCEDURE — 99213 OFFICE O/P EST LOW 20 MIN: CPT

## 2023-07-20 ASSESSMENT — LOCATION SIMPLE DESCRIPTION DERM
LOCATION SIMPLE: UPPER BACK
LOCATION SIMPLE: RIGHT WRIST
LOCATION SIMPLE: RIGHT FOREHEAD
LOCATION SIMPLE: RIGHT SHOULDER
LOCATION SIMPLE: RIGHT FOREARM
LOCATION SIMPLE: LEFT SHOULDER
LOCATION SIMPLE: CHEST
LOCATION SIMPLE: LEFT HAND
LOCATION SIMPLE: LEFT FOREARM
LOCATION SIMPLE: ABDOMEN
LOCATION SIMPLE: RIGHT UPPER BACK

## 2023-07-20 ASSESSMENT — LOCATION DETAILED DESCRIPTION DERM
LOCATION DETAILED: SUPERIOR THORACIC SPINE
LOCATION DETAILED: LEFT THENAR EMINENCE
LOCATION DETAILED: RIGHT INFERIOR MEDIAL FOREHEAD
LOCATION DETAILED: RIGHT INFERIOR UPPER BACK
LOCATION DETAILED: UPPER STERNUM
LOCATION DETAILED: RIGHT PROXIMAL DORSAL FOREARM
LOCATION DETAILED: EPIGASTRIC SKIN
LOCATION DETAILED: LEFT PROXIMAL DORSAL FOREARM
LOCATION DETAILED: LEFT POSTERIOR SHOULDER
LOCATION DETAILED: RIGHT POSTERIOR SHOULDER
LOCATION DETAILED: RIGHT VENTRAL WRIST

## 2023-07-20 ASSESSMENT — LOCATION ZONE DERM
LOCATION ZONE: ARM
LOCATION ZONE: TRUNK
LOCATION ZONE: FACE
LOCATION ZONE: HAND

## 2023-07-20 NOTE — HPI: EVALUATION OF SKIN LESION(S)
Hpi Title: Evaluation of Skin Lesions
How Severe Are Your Spot(S)?: mild
Have Your Spot(S) Been Treated In The Past?: has not been treated
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 No lymph node taken. Took 2 excisions to clear.

## 2023-07-20 NOTE — PROCEDURE: REASSURANCE
When Should The Patient Follow-Up For Their Next Full-Body Skin Exam?: 6 Months
Quality 137: Melanoma: Continuity Of Care - Recall System: Patient information entered into a recall system that includes: target date for the next exam specified AND a process to follow up with patients regarding missed or unscheduled appointments
Detail Level: Detailed
No

## 2023-09-20 ENCOUNTER — ESTABLISHED PATIENT (OUTPATIENT)
Dept: URBAN - METROPOLITAN AREA CLINIC 49 | Facility: LOCATION | Age: 78
End: 2023-09-20

## 2023-09-20 DIAGNOSIS — H04.123: ICD-10-CM

## 2023-09-20 DIAGNOSIS — H40.1131: ICD-10-CM

## 2023-09-20 DIAGNOSIS — H10.13: ICD-10-CM

## 2023-09-20 DIAGNOSIS — J32.2: ICD-10-CM

## 2023-09-20 PROCEDURE — 76514 ECHO EXAM OF EYE THICKNESS: CPT

## 2023-09-20 PROCEDURE — 99213 OFFICE O/P EST LOW 20 MIN: CPT

## 2023-09-20 RX ORDER — LATANOPROST 50 UG/ML
1 SOLUTION/ DROPS OPHTHALMIC EVERY EVENING
Start: 2023-09-20

## 2023-09-20 RX ORDER — OLOPATADINE HYDROCHLORIDE 2 MG/ML
1 SOLUTION OPHTHALMIC EVERY MORNING
Start: 2023-09-20

## 2023-09-20 ASSESSMENT — VISUAL ACUITY
OS_SC: 20/30
OD_SC: 20/40-1
OD_PH: 20/30

## 2023-09-20 ASSESSMENT — PACHYMETRY
OS_CT_UM: 566
OD_CT_UM: 612

## 2023-09-20 ASSESSMENT — TONOMETRY
OS_IOP_MMHG: 18
OD_IOP_MMHG: 18
OD_IOP_MMHG: 13
OS_IOP_MMHG: 17

## 2023-10-19 ENCOUNTER — FOLLOW UP (OUTPATIENT)
Dept: URBAN - METROPOLITAN AREA CLINIC 49 | Facility: LOCATION | Age: 78
End: 2023-10-19

## 2023-10-19 DIAGNOSIS — H40.1131: ICD-10-CM

## 2023-10-19 PROCEDURE — 92012 INTRM OPH EXAM EST PATIENT: CPT

## 2023-10-19 ASSESSMENT — TONOMETRY
OS_IOP_MMHG: 17
OD_IOP_MMHG: 12
OD_IOP_MMHG: 17
OS_IOP_MMHG: 18

## 2023-10-19 ASSESSMENT — VISUAL ACUITY
OD_PH: 20/25
OS_SC: 20/25
OD_SC: 20/30+2

## 2024-02-15 ENCOUNTER — COMPREHENSIVE EXAM (OUTPATIENT)
Dept: URBAN - METROPOLITAN AREA CLINIC 49 | Facility: LOCATION | Age: 79
End: 2024-02-15

## 2024-02-15 DIAGNOSIS — H35.363: ICD-10-CM

## 2024-02-15 DIAGNOSIS — H16.223: ICD-10-CM

## 2024-02-15 DIAGNOSIS — H43.813: ICD-10-CM

## 2024-02-15 DIAGNOSIS — H10.13: ICD-10-CM

## 2024-02-15 DIAGNOSIS — H40.013: ICD-10-CM

## 2024-02-15 DIAGNOSIS — H26.493: ICD-10-CM

## 2024-02-15 DIAGNOSIS — H40.1131: ICD-10-CM

## 2024-02-15 PROCEDURE — 92134 CPTRZ OPH DX IMG PST SGM RTA: CPT

## 2024-02-15 PROCEDURE — 99214 OFFICE O/P EST MOD 30 MIN: CPT

## 2024-02-15 PROCEDURE — 92015 DETERMINE REFRACTIVE STATE: CPT

## 2024-02-15 ASSESSMENT — VISUAL ACUITY
OU_CC: J1 @ 16IN
OD_GLARE: 20/25-1
OS_PH: 20/25-1
OD_GLARE: 20/25
OD_SC: 20/30-1
OS_GLARE: 20/40
OS_GLARE: 20/30
OS_SC: 20/30-1

## 2024-02-15 ASSESSMENT — TONOMETRY
OD_IOP_MMHG: 17
OS_IOP_MMHG: 17
OD_IOP_MMHG: 12
OS_IOP_MMHG: 16

## 2024-03-21 ENCOUNTER — ESTABLISHED PATIENT (OUTPATIENT)
Dept: URBAN - METROPOLITAN AREA CLINIC 49 | Facility: LOCATION | Age: 79
End: 2024-03-21

## 2024-03-21 DIAGNOSIS — H40.1131: ICD-10-CM

## 2024-03-21 DIAGNOSIS — H11.32: ICD-10-CM

## 2024-03-21 PROCEDURE — 99214 OFFICE O/P EST MOD 30 MIN: CPT

## 2024-03-21 ASSESSMENT — TONOMETRY
OS_IOP_MMHG: 13
OD_IOP_MMHG: 14
OD_IOP_MMHG: 9
OS_IOP_MMHG: 14

## 2024-03-21 ASSESSMENT — VISUAL ACUITY
OD_SC: 20/40
OS_PH: 20/25-2
OS_SC: 20/30
OU_SC: 20/30
OD_PH: 20/30-1

## 2024-05-06 ENCOUNTER — TELEPHONE (OUTPATIENT)
Dept: FAMILY MEDICINE | Age: 79
End: 2024-05-06

## 2024-05-07 ENCOUNTER — APPOINTMENT (OUTPATIENT)
Dept: PODIATRY | Age: 79
End: 2024-05-07

## 2024-05-08 ENCOUNTER — APPOINTMENT (OUTPATIENT)
Dept: PODIATRY | Age: 79
End: 2024-05-08

## 2024-05-09 ENCOUNTER — OFFICE VISIT (OUTPATIENT)
Dept: FAMILY MEDICINE | Age: 79
End: 2024-05-09

## 2024-05-09 VITALS
BODY MASS INDEX: 19.46 KG/M2 | TEMPERATURE: 97.6 F | DIASTOLIC BLOOD PRESSURE: 88 MMHG | SYSTOLIC BLOOD PRESSURE: 142 MMHG | HEIGHT: 68 IN | WEIGHT: 128.4 LBS | HEART RATE: 64 BPM | RESPIRATION RATE: 18 BRPM | OXYGEN SATURATION: 98 %

## 2024-05-09 DIAGNOSIS — E03.9 HYPOTHYROIDISM, UNSPECIFIED TYPE: ICD-10-CM

## 2024-05-09 DIAGNOSIS — R00.0 TACHYCARDIA: ICD-10-CM

## 2024-05-09 DIAGNOSIS — I47.19 ATRIAL TACHYCARDIA, PAROXYSMAL (CMD): ICD-10-CM

## 2024-05-09 DIAGNOSIS — D22.9 NUMEROUS MOLES: ICD-10-CM

## 2024-05-09 DIAGNOSIS — Z76.89 ENCOUNTER TO ESTABLISH CARE: Primary | ICD-10-CM

## 2024-05-09 DIAGNOSIS — R35.0 INCREASED URINARY FREQUENCY: ICD-10-CM

## 2024-05-09 DIAGNOSIS — Z02.2 ENCOUNTER FOR EXAMINATION FOR ADMISSION TO ASSISTED LIVING FACILITY: ICD-10-CM

## 2024-05-09 DIAGNOSIS — K58.9 IRRITABLE BOWEL SYNDROME, UNSPECIFIED TYPE: ICD-10-CM

## 2024-05-09 DIAGNOSIS — I71.40 ABDOMINAL AORTIC ANEURYSM (AAA) WITHOUT RUPTURE, UNSPECIFIED PART (CMD): ICD-10-CM

## 2024-05-09 DIAGNOSIS — I10 HYPERTENSION, UNSPECIFIED TYPE: ICD-10-CM

## 2024-05-09 PROBLEM — Z65.8 PSYCHOSOCIAL STRESSORS: Status: ACTIVE | Noted: 2024-05-09

## 2024-05-09 PROBLEM — E07.9 THYROID DISEASE: Status: ACTIVE | Noted: 2024-05-09

## 2024-05-09 PROBLEM — D64.9 ANEMIA: Status: ACTIVE | Noted: 2022-11-16

## 2024-05-09 PROBLEM — K21.9 GERD (GASTROESOPHAGEAL REFLUX DISEASE): Status: ACTIVE | Noted: 2024-05-09

## 2024-05-09 PROBLEM — J45.909 ASTHMA (CMD): Status: ACTIVE | Noted: 2024-05-09

## 2024-05-09 PROBLEM — H91.90 HOH (HARD OF HEARING): Status: ACTIVE | Noted: 2024-05-09

## 2024-05-09 PROBLEM — Z96.1 PSEUDOPHAKIA OF BOTH EYES: Status: ACTIVE | Noted: 2019-09-18

## 2024-05-09 PROBLEM — K59.00 CONSTIPATION: Status: ACTIVE | Noted: 2022-11-19

## 2024-05-09 PROBLEM — K92.2 GI BLEED: Status: ACTIVE | Noted: 2022-11-16

## 2024-05-09 RX ORDER — DULOXETIN HYDROCHLORIDE 20 MG/1
CAPSULE, DELAYED RELEASE ORAL
COMMUNITY
Start: 2024-04-29 | End: 2024-05-09

## 2024-05-09 RX ORDER — SPIRONOLACTONE 25 MG/1
25 TABLET ORAL DAILY
COMMUNITY
End: 2024-05-09

## 2024-05-09 RX ORDER — AMOXICILLIN 500 MG/1
CAPSULE ORAL
COMMUNITY
Start: 2024-01-15 | End: 2024-05-09

## 2024-05-09 RX ORDER — CETIRIZINE HYDROCHLORIDE 10 MG/1
TABLET ORAL
COMMUNITY
Start: 2024-05-07 | End: 2024-05-09

## 2024-05-09 RX ORDER — VERAPAMIL HYDROCHLORIDE 40 MG/1
80 TABLET ORAL 2 TIMES DAILY
COMMUNITY
End: 2024-06-18

## 2024-05-09 RX ORDER — ACETAMINOPHEN 325 MG/1
650 TABLET ORAL EVERY 6 HOURS PRN
COMMUNITY
End: 2024-06-05

## 2024-05-09 RX ORDER — EZETIMIBE 10 MG/1
10 TABLET ORAL DAILY
COMMUNITY
End: 2024-06-04

## 2024-05-09 RX ORDER — VIBEGRON 75 MG/1
1 TABLET, FILM COATED ORAL DAILY
COMMUNITY
Start: 2024-05-06 | End: 2024-06-05

## 2024-05-09 RX ORDER — GABAPENTIN 100 MG/1
100 CAPSULE ORAL 2 TIMES DAILY
COMMUNITY
Start: 2024-04-02 | End: 2024-06-05

## 2024-05-09 RX ORDER — SPIRONOLACTONE 50 MG/1
50 TABLET, FILM COATED ORAL DAILY
COMMUNITY
Start: 2024-03-28 | End: 2024-06-05

## 2024-05-09 RX ORDER — LEVOTHYROXINE SODIUM 0.03 MG/1
25 TABLET ORAL DAILY
COMMUNITY
Start: 2024-05-06 | End: 2024-06-04

## 2024-05-09 RX ORDER — CYCLOBENZAPRINE HCL 10 MG
10 TABLET ORAL
COMMUNITY
End: 2024-07-03

## 2024-05-09 RX ORDER — ALBUTEROL SULFATE 90 UG/1
2 AEROSOL, METERED RESPIRATORY (INHALATION)
COMMUNITY
End: 2024-07-03

## 2024-05-09 RX ORDER — ESTRADIOL 0.1 MG/G
0.15 CREAM VAGINAL DAILY
COMMUNITY

## 2024-05-09 RX ORDER — LOSARTAN POTASSIUM 100 MG/1
100 TABLET ORAL DAILY
COMMUNITY
Start: 2024-04-25 | End: 2024-06-05

## 2024-05-09 RX ORDER — TRAZODONE HYDROCHLORIDE 100 MG/1
100 TABLET ORAL AT BEDTIME
COMMUNITY
End: 2024-06-04

## 2024-05-09 RX ORDER — FLUTICASONE PROPIONATE AND SALMETEROL 250; 50 UG/1; UG/1
POWDER RESPIRATORY (INHALATION)
COMMUNITY
Start: 2024-03-28 | End: 2024-06-04

## 2024-05-09 RX ORDER — LATANOPROST 50 UG/ML
SOLUTION/ DROPS OPHTHALMIC
COMMUNITY
Start: 2024-05-03 | End: 2024-07-02

## 2024-05-09 ASSESSMENT — PATIENT HEALTH QUESTIONNAIRE - PHQ9
SUM OF ALL RESPONSES TO PHQ9 QUESTIONS 1 AND 2: 0
SUM OF ALL RESPONSES TO PHQ9 QUESTIONS 1 AND 2: 0
1. LITTLE INTEREST OR PLEASURE IN DOING THINGS: NOT AT ALL
2. FEELING DOWN, DEPRESSED OR HOPELESS: NOT AT ALL
CLINICAL INTERPRETATION OF PHQ2 SCORE: NO FURTHER SCREENING NEEDED

## 2024-05-10 ENCOUNTER — TELEPHONE (OUTPATIENT)
Dept: FAMILY MEDICINE | Age: 79
End: 2024-05-10

## 2024-05-10 DIAGNOSIS — I71.40 ABDOMINAL AORTIC ANEURYSM (AAA) WITHOUT RUPTURE, UNSPECIFIED PART (CMD): Primary | ICD-10-CM

## 2024-05-14 ENCOUNTER — TELEPHONE (OUTPATIENT)
Dept: FAMILY MEDICINE | Age: 79
End: 2024-05-14

## 2024-05-14 DIAGNOSIS — M25.579 PAIN IN JOINT INVOLVING ANKLE AND FOOT, UNSPECIFIED LATERALITY: Primary | ICD-10-CM

## 2024-05-15 ENCOUNTER — LAB SERVICES (OUTPATIENT)
Dept: LAB | Age: 79
End: 2024-05-15

## 2024-05-15 DIAGNOSIS — R35.0 INCREASED URINARY FREQUENCY: ICD-10-CM

## 2024-05-15 DIAGNOSIS — E03.9 HYPOTHYROIDISM, UNSPECIFIED TYPE: ICD-10-CM

## 2024-05-15 DIAGNOSIS — I10 HYPERTENSION, UNSPECIFIED TYPE: ICD-10-CM

## 2024-05-15 DIAGNOSIS — Z02.2 ENCOUNTER FOR EXAMINATION FOR ADMISSION TO ASSISTED LIVING FACILITY: ICD-10-CM

## 2024-05-15 LAB
ALBUMIN SERPL-MCNC: 3.7 G/DL (ref 3.6–5.1)
ALBUMIN/GLOB SERPL: 0.9 {RATIO} (ref 1–2.4)
ALP SERPL-CCNC: 56 UNITS/L (ref 45–117)
ALT SERPL-CCNC: 35 UNITS/L
ANION GAP SERPL CALC-SCNC: 12 MMOL/L (ref 7–19)
APPEARANCE UR: NORMAL
AST SERPL-CCNC: 21 UNITS/L
BILIRUB SERPL-MCNC: 0.3 MG/DL (ref 0.2–1)
BILIRUB UR QL STRIP: NEGATIVE
BUN SERPL-MCNC: 21 MG/DL (ref 6–20)
BUN/CREAT SERPL: 23 (ref 7–25)
CALCIUM SERPL-MCNC: 8.8 MG/DL (ref 8.4–10.2)
CHLORIDE SERPL-SCNC: 104 MMOL/L (ref 97–110)
CHOLEST SERPL-MCNC: 189 MG/DL
CHOLEST/HDLC SERPL: 2.6 {RATIO}
CO2 SERPL-SCNC: 25 MMOL/L (ref 21–32)
COLOR UR: YELLOW
CREAT SERPL-MCNC: 0.91 MG/DL (ref 0.51–0.95)
DEPRECATED RDW RBC: 47.9 FL (ref 39–50)
EGFRCR SERPLBLD CKD-EPI 2021: 65 ML/MIN/{1.73_M2}
ERYTHROCYTE [DISTWIDTH] IN BLOOD: 13.7 % (ref 11–15)
FASTING DURATION TIME PATIENT: 12 HOURS (ref 0–999)
GLOBULIN SER-MCNC: 4.1 G/DL (ref 2–4)
GLUCOSE SERPL-MCNC: 89 MG/DL (ref 70–99)
GLUCOSE UR STRIP-MCNC: NEGATIVE MG/DL
HCT VFR BLD CALC: 35.7 % (ref 36–46.5)
HDLC SERPL-MCNC: 73 MG/DL
HGB BLD-MCNC: 12 G/DL (ref 12–15.5)
HGB UR QL STRIP: NEGATIVE
KETONES UR STRIP-MCNC: NEGATIVE MG/DL
LDLC SERPL CALC-MCNC: 97 MG/DL
LEUKOCYTE ESTERASE UR QL STRIP: NEGATIVE
MCH RBC QN AUTO: 31.7 PG (ref 26–34)
MCHC RBC AUTO-ENTMCNC: 33.6 G/DL (ref 32–36.5)
MCV RBC AUTO: 94.4 FL (ref 78–100)
NITRITE UR QL STRIP: NEGATIVE
NONHDLC SERPL-MCNC: 116 MG/DL
NRBC BLD MANUAL-RTO: 0 /100 WBC
PH UR STRIP: 6 [PH] (ref 5–7)
PLATELET # BLD AUTO: 300 K/MCL (ref 140–450)
POTASSIUM SERPL-SCNC: 4.1 MMOL/L (ref 3.4–5.1)
PROT SERPL-MCNC: 7.8 G/DL (ref 6.4–8.2)
PROT UR STRIP-MCNC: NEGATIVE MG/DL
RBC # BLD: 3.78 MIL/MCL (ref 4–5.2)
SODIUM SERPL-SCNC: 137 MMOL/L (ref 135–145)
SP GR UR STRIP: 1.01 (ref 1–1.03)
TRIGL SERPL-MCNC: 93 MG/DL
TSH SERPL-ACNC: 1.23 MCUNITS/ML (ref 0.35–5)
UROBILINOGEN UR STRIP-MCNC: 0.2 MG/DL
WBC # BLD: 5.2 K/MCL (ref 4.2–11)

## 2024-05-15 PROCEDURE — 86480 TB TEST CELL IMMUN MEASURE: CPT | Performed by: CLINICAL MEDICAL LABORATORY

## 2024-05-15 PROCEDURE — 81003 URINALYSIS AUTO W/O SCOPE: CPT | Performed by: CLINICAL MEDICAL LABORATORY

## 2024-05-15 PROCEDURE — 80061 LIPID PANEL: CPT | Performed by: CLINICAL MEDICAL LABORATORY

## 2024-05-15 PROCEDURE — 84443 ASSAY THYROID STIM HORMONE: CPT | Performed by: CLINICAL MEDICAL LABORATORY

## 2024-05-15 PROCEDURE — 36415 COLL VENOUS BLD VENIPUNCTURE: CPT | Performed by: INTERNAL MEDICINE

## 2024-05-15 PROCEDURE — 80053 COMPREHEN METABOLIC PANEL: CPT | Performed by: CLINICAL MEDICAL LABORATORY

## 2024-05-15 PROCEDURE — 85027 COMPLETE CBC AUTOMATED: CPT | Performed by: CLINICAL MEDICAL LABORATORY

## 2024-05-16 LAB
GAMMA INTERFERON BACKGROUND BLD IA-ACNC: 0.02 IU/ML
M TB IFN-G BLD-IMP: NEGATIVE
M TB IFN-G CD4+ BCKGRND COR BLD-ACNC: 0 IU/ML
M TB IFN-G CD4+CD8+ BCKGRND COR BLD-ACNC: 0 IU/ML
MITOGEN IGNF BCKGRD COR BLD-ACNC: 7.75 IU/ML

## 2024-05-17 ENCOUNTER — APPOINTMENT (OUTPATIENT)
Dept: REHABILITATION | Age: 79
End: 2024-05-17
Attending: FAMILY MEDICINE

## 2024-05-22 ENCOUNTER — TELEPHONE (OUTPATIENT)
Dept: DERMATOLOGY | Age: 79
End: 2024-05-22

## 2024-05-22 ENCOUNTER — TELEPHONE (OUTPATIENT)
Dept: FAMILY MEDICINE | Age: 79
End: 2024-05-22

## 2024-05-28 ENCOUNTER — TELEPHONE (OUTPATIENT)
Dept: FAMILY MEDICINE | Age: 79
End: 2024-05-28

## 2024-06-04 RX ORDER — LEVOTHYROXINE SODIUM 0.03 MG/1
25 TABLET ORAL DAILY
Qty: 30 TABLET | Refills: 0 | Status: SHIPPED | OUTPATIENT
Start: 2024-06-04

## 2024-06-04 RX ORDER — FLUTICASONE PROPIONATE AND SALMETEROL 250; 50 UG/1; UG/1
POWDER RESPIRATORY (INHALATION)
Qty: 60 EACH | Refills: 0 | Status: SHIPPED | OUTPATIENT
Start: 2024-06-04

## 2024-06-04 RX ORDER — EZETIMIBE 10 MG/1
10 TABLET ORAL DAILY
Qty: 30 TABLET | Refills: 0 | Status: SHIPPED | OUTPATIENT
Start: 2024-06-04

## 2024-06-04 RX ORDER — TRAZODONE HYDROCHLORIDE 100 MG/1
100 TABLET ORAL AT BEDTIME
Qty: 30 TABLET | Refills: 0 | Status: SHIPPED | OUTPATIENT
Start: 2024-06-04

## 2024-06-05 ENCOUNTER — OFFICE VISIT (OUTPATIENT)
Dept: FAMILY MEDICINE | Age: 79
End: 2024-06-05

## 2024-06-05 ENCOUNTER — LAB SERVICES (OUTPATIENT)
Dept: LAB | Age: 79
End: 2024-06-05

## 2024-06-05 VITALS
OXYGEN SATURATION: 96 % | DIASTOLIC BLOOD PRESSURE: 62 MMHG | HEIGHT: 68 IN | RESPIRATION RATE: 18 BRPM | BODY MASS INDEX: 18.88 KG/M2 | SYSTOLIC BLOOD PRESSURE: 112 MMHG | HEART RATE: 62 BPM | WEIGHT: 124.6 LBS | TEMPERATURE: 98.2 F

## 2024-06-05 DIAGNOSIS — R29.898 WEAKNESS OF BOTH LOWER EXTREMITIES: ICD-10-CM

## 2024-06-05 DIAGNOSIS — G62.9 NEUROPATHY: ICD-10-CM

## 2024-06-05 DIAGNOSIS — H40.9 GLAUCOMA, UNSPECIFIED GLAUCOMA TYPE, UNSPECIFIED LATERALITY: ICD-10-CM

## 2024-06-05 DIAGNOSIS — G50.0 TRIGEMINAL NEURALGIA: Primary | ICD-10-CM

## 2024-06-05 LAB
CRP SERPL-MCNC: <3 MG/L
ERYTHROCYTE [SEDIMENTATION RATE] IN BLOOD BY WESTERGREN METHOD: 14 MM/HR (ref 0–20)
RHEUMATOID FACT SER NEPH-ACNC: <10 UNITS/ML

## 2024-06-05 PROCEDURE — 99215 OFFICE O/P EST HI 40 MIN: CPT | Performed by: FAMILY MEDICINE

## 2024-06-05 PROCEDURE — 85652 RBC SED RATE AUTOMATED: CPT | Performed by: CLINICAL MEDICAL LABORATORY

## 2024-06-05 PROCEDURE — 86431 RHEUMATOID FACTOR QUANT: CPT | Performed by: CLINICAL MEDICAL LABORATORY

## 2024-06-05 PROCEDURE — 36415 COLL VENOUS BLD VENIPUNCTURE: CPT | Performed by: INTERNAL MEDICINE

## 2024-06-05 PROCEDURE — 86140 C-REACTIVE PROTEIN: CPT | Performed by: CLINICAL MEDICAL LABORATORY

## 2024-06-05 PROCEDURE — 86200 CCP ANTIBODY: CPT | Performed by: CLINICAL MEDICAL LABORATORY

## 2024-06-05 RX ORDER — VERAPAMIL HYDROCHLORIDE 80 MG/1
80 TABLET ORAL 2 TIMES DAILY
Qty: 60 TABLET | Refills: 0 | Status: SHIPPED | OUTPATIENT
Start: 2024-06-05

## 2024-06-05 RX ORDER — VIBEGRON 75 MG/1
1 TABLET, FILM COATED ORAL DAILY
Qty: 30 TABLET | Refills: 0 | Status: SHIPPED | OUTPATIENT
Start: 2024-06-05

## 2024-06-05 RX ORDER — GABAPENTIN 100 MG/1
CAPSULE ORAL
Qty: 60 CAPSULE | Refills: 0 | Status: SHIPPED | OUTPATIENT
Start: 2024-06-05

## 2024-06-05 RX ORDER — SPIRONOLACTONE 50 MG/1
50 TABLET, FILM COATED ORAL DAILY
Qty: 30 TABLET | Refills: 0 | Status: SHIPPED | OUTPATIENT
Start: 2024-06-05

## 2024-06-05 RX ORDER — BACLOFEN 5 MG/1
5 TABLET ORAL 3 TIMES DAILY PRN
Qty: 90 TABLET | Refills: 0 | Status: SHIPPED | OUTPATIENT
Start: 2024-06-05

## 2024-06-05 RX ORDER — ACETAMINOPHEN 325 MG/1
TABLET ORAL
Qty: 30 TABLET | Refills: 0 | Status: SHIPPED | OUTPATIENT
Start: 2024-06-05

## 2024-06-05 RX ORDER — ESTRADIOL 0.1 MG/G
CREAM VAGINAL
Qty: 42.5 EACH | Refills: 0 | Status: SHIPPED | OUTPATIENT
Start: 2024-06-05

## 2024-06-05 RX ORDER — HYDROCODONE BITARTRATE AND ACETAMINOPHEN 5; 325 MG/1; MG/1
1 TABLET ORAL 2 TIMES DAILY PRN
Qty: 30 TABLET | Refills: 0 | Status: SHIPPED | OUTPATIENT
Start: 2024-06-05

## 2024-06-05 RX ORDER — LOSARTAN POTASSIUM 100 MG/1
100 TABLET ORAL DAILY
Qty: 30 TABLET | Refills: 0 | Status: SHIPPED | OUTPATIENT
Start: 2024-06-05

## 2024-06-05 RX ORDER — PREDNISONE 20 MG/1
20 TABLET ORAL
Qty: 5 TABLET | Refills: 0 | Status: SHIPPED | OUTPATIENT
Start: 2024-06-05 | End: 2024-06-10

## 2024-06-05 ASSESSMENT — PATIENT HEALTH QUESTIONNAIRE - PHQ9
2. FEELING DOWN, DEPRESSED OR HOPELESS: NOT AT ALL
SUM OF ALL RESPONSES TO PHQ9 QUESTIONS 1 AND 2: 0
1. LITTLE INTEREST OR PLEASURE IN DOING THINGS: NOT AT ALL
CLINICAL INTERPRETATION OF PHQ2 SCORE: NO FURTHER SCREENING NEEDED
IS THE PATIENT ABLE TO COGNITIVELY COMPLETE THE PHQ9: NO
SUM OF ALL RESPONSES TO PHQ9 QUESTIONS 1 AND 2: 0
IS PATIENT ABLE TO COMPLETE PHQ2 OR PHQ9: DEMENTIA

## 2024-06-06 ENCOUNTER — TELEPHONE (OUTPATIENT)
Dept: NEUROLOGY | Age: 79
End: 2024-06-06

## 2024-06-06 LAB — CCP AB SER IA-ACNC: 2 UNITS

## 2024-06-07 ENCOUNTER — TELEPHONE (OUTPATIENT)
Dept: FAMILY MEDICINE | Age: 79
End: 2024-06-07

## 2024-06-09 ENCOUNTER — E-ADVICE (OUTPATIENT)
Dept: FAMILY MEDICINE | Age: 79
End: 2024-06-09

## 2024-06-09 DIAGNOSIS — G50.0 TRIGEMINAL NEURALGIA PAIN: Primary | ICD-10-CM

## 2024-06-10 ENCOUNTER — TELEPHONE (OUTPATIENT)
Dept: FAMILY MEDICINE | Age: 79
End: 2024-06-10

## 2024-06-10 DIAGNOSIS — G62.9 NEUROPATHY: Primary | ICD-10-CM

## 2024-06-10 RX ORDER — CARBAMAZEPINE 100 MG/1
100 CAPSULE, EXTENDED RELEASE ORAL 2 TIMES DAILY
Qty: 60 CAPSULE | Refills: 1 | Status: SHIPPED | OUTPATIENT
Start: 2024-06-10 | End: 2024-06-12

## 2024-06-11 ENCOUNTER — TELEPHONE (OUTPATIENT)
Dept: FAMILY MEDICINE | Age: 79
End: 2024-06-11

## 2024-06-11 RX ORDER — LOSARTAN POTASSIUM 100 MG/1
100 TABLET ORAL DAILY
Qty: 30 TABLET | Refills: 0 | Status: SHIPPED | OUTPATIENT
Start: 2024-06-11

## 2024-06-11 RX ORDER — LEVOTHYROXINE SODIUM 0.03 MG/1
25 TABLET ORAL DAILY
Qty: 30 TABLET | Refills: 0 | Status: SHIPPED | OUTPATIENT
Start: 2024-06-11

## 2024-06-11 RX ORDER — EZETIMIBE 10 MG/1
10 TABLET ORAL DAILY
Qty: 30 TABLET | Refills: 0 | Status: SHIPPED | OUTPATIENT
Start: 2024-06-11

## 2024-06-11 RX ORDER — TRAZODONE HYDROCHLORIDE 100 MG/1
100 TABLET ORAL AT BEDTIME
Qty: 30 TABLET | Refills: 0 | Status: SHIPPED | OUTPATIENT
Start: 2024-06-11

## 2024-06-25 DIAGNOSIS — K58.9 IRRITABLE BOWEL SYNDROME, UNSPECIFIED TYPE: ICD-10-CM

## 2024-06-27 ENCOUNTER — TELEPHONE (OUTPATIENT)
Dept: FAMILY MEDICINE | Age: 79
End: 2024-06-27

## 2024-06-28 ENCOUNTER — TELEPHONE (OUTPATIENT)
Dept: FAMILY MEDICINE | Age: 79
End: 2024-06-28

## 2024-06-28 DIAGNOSIS — H91.93 BILATERAL HEARING LOSS, UNSPECIFIED HEARING LOSS TYPE: Primary | ICD-10-CM

## 2024-06-30 ENCOUNTER — E-ADVICE (OUTPATIENT)
Dept: FAMILY MEDICINE | Age: 79
End: 2024-06-30

## 2024-07-02 RX ORDER — LATANOPROST 50 UG/ML
SOLUTION/ DROPS OPHTHALMIC
Qty: 2.5 EACH | Refills: 0 | Status: SHIPPED | OUTPATIENT
Start: 2024-07-02

## 2024-07-03 ENCOUNTER — APPOINTMENT (OUTPATIENT)
Dept: GENERAL RADIOLOGY | Age: 79
DRG: 178 | End: 2024-07-03
Attending: EMERGENCY MEDICINE

## 2024-07-03 ENCOUNTER — APPOINTMENT (OUTPATIENT)
Dept: GENERAL RADIOLOGY | Age: 79
DRG: 178 | End: 2024-07-03
Attending: PHYSICIAN ASSISTANT

## 2024-07-03 ENCOUNTER — HOSPITAL ENCOUNTER (INPATIENT)
Age: 79
LOS: 2 days | Discharge: HOME HEALTH | DRG: 178 | End: 2024-07-05
Attending: EMERGENCY MEDICINE | Admitting: STUDENT IN AN ORGANIZED HEALTH CARE EDUCATION/TRAINING PROGRAM

## 2024-07-03 DIAGNOSIS — R53.1 WEAKNESS GENERALIZED: ICD-10-CM

## 2024-07-03 DIAGNOSIS — N30.01 ACUTE CYSTITIS WITH HEMATURIA: ICD-10-CM

## 2024-07-03 DIAGNOSIS — U07.1 COVID-19 VIRUS INFECTION: Primary | ICD-10-CM

## 2024-07-03 DIAGNOSIS — G50.0 TRIGEMINAL NEURALGIA: ICD-10-CM

## 2024-07-03 LAB
ALBUMIN SERPL-MCNC: 3.8 G/DL (ref 3.6–5.1)
ALBUMIN/GLOB SERPL: 0.8 {RATIO} (ref 1–2.4)
ALP SERPL-CCNC: 62 UNITS/L (ref 45–117)
ALT SERPL-CCNC: 39 UNITS/L
ANION GAP BLD CALC-SCNC: 17 MMOL/L (ref 7–19)
ANION GAP SERPL CALC-SCNC: 20 MMOL/L (ref 7–19)
APPEARANCE UR: ABNORMAL
AST SERPL-CCNC: 35 UNITS/L
ATRIAL RATE (BPM): 70
BACTERIA #/AREA URNS HPF: ABNORMAL /HPF
BASOPHILS # BLD: 0 K/MCL (ref 0–0.3)
BASOPHILS NFR BLD: 1 %
BILIRUB SERPL-MCNC: 0.3 MG/DL (ref 0.2–1)
BILIRUB UR QL STRIP: NEGATIVE
BUN BLD-MCNC: 26 MG/DL (ref 6–20)
BUN SERPL-MCNC: 27 MG/DL (ref 6–20)
BUN/CREAT SERPL: 22 (ref 7–25)
CA-I BLD-SCNC: 1.13 MMOL/L (ref 1.15–1.29)
CALCIUM SERPL-MCNC: 9.1 MG/DL (ref 8.4–10.2)
CHLORIDE BLD-SCNC: 95 MMOL/L (ref 97–110)
CHLORIDE SERPL-SCNC: 91 MMOL/L (ref 97–110)
CO2 BLD-SCNC: 19 MMOL/L (ref 19–24)
CO2 SERPL-SCNC: 20 MMOL/L (ref 21–32)
COLOR UR: YELLOW
CREAT SERPL-MCNC: 1.22 MG/DL (ref 0.51–0.95)
CREAT SERPL-MCNC: 1.4 MG/DL (ref 0.51–0.95)
DEPRECATED RDW RBC: 47.4 FL (ref 39–50)
DIASTOLIC BLOOD PRESSURE: 84
EGFRCR SERPLBLD CKD-EPI 2021: 39 ML/MIN/{1.73_M2}
EGFRCR SERPLBLD CKD-EPI 2021: 45 ML/MIN/{1.73_M2}
EOSINOPHIL # BLD: 0 K/MCL (ref 0–0.5)
EOSINOPHIL NFR BLD: 0 %
ERYTHROCYTE [DISTWIDTH] IN BLOOD: 14 % (ref 11–15)
FASTING DURATION TIME PATIENT: ABNORMAL H
FLUAV RNA RESP QL NAA+PROBE: NOT DETECTED
FLUBV RNA RESP QL NAA+PROBE: NOT DETECTED
GLOBULIN SER-MCNC: 4.6 G/DL (ref 2–4)
GLUCOSE BLD-MCNC: 102 MG/DL (ref 70–99)
GLUCOSE BLDC GLUCOMTR-MCNC: 128 MG/DL (ref 70–99)
GLUCOSE SERPL-MCNC: 99 MG/DL (ref 70–99)
GLUCOSE UR STRIP-MCNC: NEGATIVE MG/DL
HCT VFR BLD CALC: 35.3 % (ref 36–46.5)
HCT VFR BLD CALC: 36 % (ref 36–46.5)
HGB BLD CALC-MCNC: 12.2 G/DL (ref 12–15.5)
HGB BLD-MCNC: 11.8 G/DL (ref 12–15.5)
HGB UR QL STRIP: NEGATIVE
HYALINE CASTS #/AREA URNS LPF: ABNORMAL /LPF
IMM GRANULOCYTES # BLD AUTO: 0 K/MCL (ref 0–0.2)
IMM GRANULOCYTES # BLD: 0 %
KETONES UR STRIP-MCNC: NEGATIVE MG/DL
LACTATE BLDV-SCNC: 1.5 MMOL/L (ref 0–2)
LACTATE BLDV-SCNC: 1.6 MMOL/L
LACTATE BLDV-SCNC: 3 MMOL/L
LEUKOCYTE ESTERASE UR QL STRIP: ABNORMAL
LYMPHOCYTES # BLD: 1.1 K/MCL (ref 1–4)
LYMPHOCYTES NFR BLD: 25 %
MCH RBC QN AUTO: 30.6 PG (ref 26–34)
MCHC RBC AUTO-ENTMCNC: 33.4 G/DL (ref 32–36.5)
MCV RBC AUTO: 91.5 FL (ref 78–100)
MONOCYTES # BLD: 0.5 K/MCL (ref 0.3–0.9)
MONOCYTES NFR BLD: 11 %
MUCOUS THREADS URNS QL MICRO: PRESENT
NEUTROPHILS # BLD: 2.6 K/MCL (ref 1.8–7.7)
NEUTROPHILS NFR BLD: 63 %
NITRITE UR QL STRIP: NEGATIVE
NRBC BLD MANUAL-RTO: 0 /100 WBC
NT-PROBNP SERPL-MCNC: 270 PG/ML
PH UR STRIP: 6 [PH] (ref 5–7)
PLATELET # BLD AUTO: 234 K/MCL (ref 140–450)
POTASSIUM BLD-SCNC: 3.9 MMOL/L (ref 3.4–5.1)
POTASSIUM SERPL-SCNC: 3.9 MMOL/L (ref 3.4–5.1)
PROT SERPL-MCNC: 8.4 G/DL (ref 6.4–8.2)
PROT UR STRIP-MCNC: 30 MG/DL
QRS-INTERVAL (MSEC): 146
QT-INTERVAL (MSEC): 396
QTC: 448
R AXIS (DEGREES): 85
RAINBOW EXTRA TUBES HOLD SPECIMEN: NORMAL
RBC # BLD: 3.86 MIL/MCL (ref 4–5.2)
RBC #/AREA URNS HPF: ABNORMAL /HPF
REPORT TEXT: NORMAL
RSV AG NPH QL IA.RAPID: NOT DETECTED
SARS-COV-2 N GENE CT SPEC QN NAA N2: 17.9
SARS-COV-2 RNA RESP QL NAA+PROBE: DETECTED
SERVICE CMNT-IMP: ABNORMAL
SODIUM BLD-SCNC: 127 MMOL/L (ref 135–145)
SODIUM SERPL-SCNC: 127 MMOL/L (ref 135–145)
SP GR UR STRIP: 1.02 (ref 1–1.03)
SQUAMOUS #/AREA URNS HPF: ABNORMAL /HPF
SYSTOLIC BLOOD PRESSURE: 133
T AXIS (DEGREES): 49
TRANS CELLS #/AREA URNS HPF: ABNORMAL /HPF
TROPONIN I BLD-MCNC: <0.1 NG/ML
TROPONIN I SERPL DL<=0.01 NG/ML-MCNC: 15 NG/L
UROBILINOGEN UR STRIP-MCNC: 0.2 MG/DL
VENTRICULAR RATE EKG/MIN (BPM): 77
WBC # BLD: 4.2 K/MCL (ref 4.2–11)
WBC #/AREA URNS HPF: ABNORMAL /HPF

## 2024-07-03 PROCEDURE — 10004651 HB RX, NO CHARGE ITEM: Performed by: EMERGENCY MEDICINE

## 2024-07-03 PROCEDURE — 71045 X-RAY EXAM CHEST 1 VIEW: CPT

## 2024-07-03 PROCEDURE — 10002807 HB RX 258: Performed by: EMERGENCY MEDICINE

## 2024-07-03 PROCEDURE — 87040 BLOOD CULTURE FOR BACTERIA: CPT | Performed by: EMERGENCY MEDICINE

## 2024-07-03 PROCEDURE — 97161 PT EVAL LOW COMPLEX 20 MIN: CPT

## 2024-07-03 PROCEDURE — 10002807 HB RX 258: Performed by: PHYSICIAN ASSISTANT

## 2024-07-03 PROCEDURE — 83880 ASSAY OF NATRIURETIC PEPTIDE: CPT | Performed by: EMERGENCY MEDICINE

## 2024-07-03 PROCEDURE — 96372 THER/PROPH/DIAG INJ SC/IM: CPT | Performed by: PHYSICIAN ASSISTANT

## 2024-07-03 PROCEDURE — 83605 ASSAY OF LACTIC ACID: CPT

## 2024-07-03 PROCEDURE — 85025 COMPLETE CBC W/AUTO DIFF WBC: CPT | Performed by: EMERGENCY MEDICINE

## 2024-07-03 PROCEDURE — 93005 ELECTROCARDIOGRAM TRACING: CPT | Performed by: EMERGENCY MEDICINE

## 2024-07-03 PROCEDURE — 87086 URINE CULTURE/COLONY COUNT: CPT | Performed by: EMERGENCY MEDICINE

## 2024-07-03 PROCEDURE — 99223 1ST HOSP IP/OBS HIGH 75: CPT | Performed by: PHYSICIAN ASSISTANT

## 2024-07-03 PROCEDURE — 10002801 HB RX 250 W/O HCPCS: Performed by: EMERGENCY MEDICINE

## 2024-07-03 PROCEDURE — 81001 URINALYSIS AUTO W/SCOPE: CPT | Performed by: EMERGENCY MEDICINE

## 2024-07-03 PROCEDURE — 74018 RADEX ABDOMEN 1 VIEW: CPT | Performed by: RADIOLOGY

## 2024-07-03 PROCEDURE — 10000002 HB ROOM CHARGE MED SURG

## 2024-07-03 PROCEDURE — 99285 EMERGENCY DEPT VISIT HI MDM: CPT

## 2024-07-03 PROCEDURE — 10004173 HB COUNTER-THERAPY VISIT PT

## 2024-07-03 PROCEDURE — 80047 BASIC METABLC PNL IONIZED CA: CPT

## 2024-07-03 PROCEDURE — 74018 RADEX ABDOMEN 1 VIEW: CPT

## 2024-07-03 PROCEDURE — 84484 ASSAY OF TROPONIN QUANT: CPT

## 2024-07-03 PROCEDURE — 36415 COLL VENOUS BLD VENIPUNCTURE: CPT

## 2024-07-03 PROCEDURE — 10002800 HB RX 250 W HCPCS: Performed by: PHYSICIAN ASSISTANT

## 2024-07-03 PROCEDURE — 96374 THER/PROPH/DIAG INJ IV PUSH: CPT

## 2024-07-03 PROCEDURE — 36415 COLL VENOUS BLD VENIPUNCTURE: CPT | Performed by: PHYSICIAN ASSISTANT

## 2024-07-03 PROCEDURE — 94664 DEMO&/EVAL PT USE INHALER: CPT

## 2024-07-03 PROCEDURE — S0310 HOSPITALIST VISIT: HCPCS | Performed by: HOSPITALIST

## 2024-07-03 PROCEDURE — 82962 GLUCOSE BLOOD TEST: CPT

## 2024-07-03 PROCEDURE — 96361 HYDRATE IV INFUSION ADD-ON: CPT

## 2024-07-03 PROCEDURE — 10002803 HB RX 637: Performed by: PHYSICIAN ASSISTANT

## 2024-07-03 PROCEDURE — 84484 ASSAY OF TROPONIN QUANT: CPT | Performed by: EMERGENCY MEDICINE

## 2024-07-03 PROCEDURE — 10002801 HB RX 250 W/O HCPCS: Performed by: PHYSICIAN ASSISTANT

## 2024-07-03 PROCEDURE — 71045 X-RAY EXAM CHEST 1 VIEW: CPT | Performed by: RADIOLOGY

## 2024-07-03 PROCEDURE — 10002800 HB RX 250 W HCPCS: Performed by: EMERGENCY MEDICINE

## 2024-07-03 PROCEDURE — 80053 COMPREHEN METABOLIC PANEL: CPT | Performed by: EMERGENCY MEDICINE

## 2024-07-03 PROCEDURE — 83605 ASSAY OF LACTIC ACID: CPT | Performed by: PHYSICIAN ASSISTANT

## 2024-07-03 PROCEDURE — 0241U COVID/FLU/RSV PANEL: CPT | Performed by: EMERGENCY MEDICINE

## 2024-07-03 PROCEDURE — 94640 AIRWAY INHALATION TREATMENT: CPT

## 2024-07-03 RX ORDER — 0.9 % SODIUM CHLORIDE 0.9 %
2 VIAL (ML) INJECTION EVERY 12 HOURS SCHEDULED
Status: DISCONTINUED | OUTPATIENT
Start: 2024-07-03 | End: 2024-07-03

## 2024-07-03 RX ORDER — HYDROXYZINE HYDROCHLORIDE 10 MG/1
10 TABLET, FILM COATED ORAL EVERY 6 HOURS PRN
Status: DISCONTINUED | OUTPATIENT
Start: 2024-07-03 | End: 2024-07-03

## 2024-07-03 RX ORDER — VERAPAMIL HYDROCHLORIDE 40 MG/1
40 TABLET ORAL 2 TIMES DAILY
Status: DISCONTINUED | OUTPATIENT
Start: 2024-07-04 | End: 2024-07-05 | Stop reason: HOSPADM

## 2024-07-03 RX ORDER — LATANOPROST 50 UG/ML
1 SOLUTION/ DROPS OPHTHALMIC NIGHTLY
Status: DISCONTINUED | OUTPATIENT
Start: 2024-07-03 | End: 2024-07-05 | Stop reason: HOSPADM

## 2024-07-03 RX ORDER — LOSARTAN POTASSIUM 50 MG/1
100 TABLET ORAL DAILY
Status: DISCONTINUED | OUTPATIENT
Start: 2024-07-03 | End: 2024-07-05 | Stop reason: HOSPADM

## 2024-07-03 RX ORDER — SPIRONOLACTONE 25 MG/1
50 TABLET ORAL DAILY
Status: DISCONTINUED | OUTPATIENT
Start: 2024-07-03 | End: 2024-07-05 | Stop reason: HOSPADM

## 2024-07-03 RX ORDER — ACETAMINOPHEN 325 MG/1
650 TABLET ORAL EVERY 4 HOURS PRN
Status: DISCONTINUED | OUTPATIENT
Start: 2024-07-03 | End: 2024-07-05 | Stop reason: HOSPADM

## 2024-07-03 RX ORDER — ACETAMINOPHEN 650 MG/1
650 SUPPOSITORY RECTAL EVERY 4 HOURS PRN
Status: DISCONTINUED | OUTPATIENT
Start: 2024-07-03 | End: 2024-07-05 | Stop reason: HOSPADM

## 2024-07-03 RX ORDER — ONDANSETRON 2 MG/ML
4 INJECTION INTRAMUSCULAR; INTRAVENOUS EVERY 12 HOURS PRN
Status: DISCONTINUED | OUTPATIENT
Start: 2024-07-03 | End: 2024-07-05 | Stop reason: HOSPADM

## 2024-07-03 RX ORDER — HYDRALAZINE HYDROCHLORIDE 20 MG/ML
10 INJECTION INTRAMUSCULAR; INTRAVENOUS EVERY 6 HOURS PRN
Status: DISCONTINUED | OUTPATIENT
Start: 2024-07-03 | End: 2024-07-05 | Stop reason: HOSPADM

## 2024-07-03 RX ORDER — ACETAMINOPHEN 500 MG
1000 TABLET ORAL ONCE
Status: COMPLETED | OUTPATIENT
Start: 2024-07-03 | End: 2024-07-03

## 2024-07-03 RX ORDER — TRAZODONE HYDROCHLORIDE 50 MG/1
50 TABLET ORAL AT BEDTIME
Status: DISCONTINUED | OUTPATIENT
Start: 2024-07-03 | End: 2024-07-05 | Stop reason: HOSPADM

## 2024-07-03 RX ORDER — LANOLIN ALCOHOL/MO/W.PET/CERES
6 CREAM (GRAM) TOPICAL NIGHTLY PRN
Status: DISCONTINUED | OUTPATIENT
Start: 2024-07-03 | End: 2024-07-05 | Stop reason: HOSPADM

## 2024-07-03 RX ORDER — HEPARIN SODIUM 5000 [USP'U]/ML
5000 INJECTION, SOLUTION INTRAVENOUS; SUBCUTANEOUS EVERY 8 HOURS SCHEDULED
Status: DISCONTINUED | OUTPATIENT
Start: 2024-07-03 | End: 2024-07-05 | Stop reason: HOSPADM

## 2024-07-03 RX ORDER — SODIUM CHLORIDE 9 MG/ML
INJECTION, SOLUTION INTRAVENOUS CONTINUOUS
Status: DISCONTINUED | OUTPATIENT
Start: 2024-07-03 | End: 2024-07-05 | Stop reason: HOSPADM

## 2024-07-03 RX ORDER — CEFAZOLIN SODIUM/WATER 2 G/20 ML
2000 SYRINGE (ML) INTRAVENOUS ONCE
Status: COMPLETED | OUTPATIENT
Start: 2024-07-03 | End: 2024-07-03

## 2024-07-03 RX ORDER — LEVOTHYROXINE SODIUM 0.03 MG/1
25 TABLET ORAL DAILY
Status: DISCONTINUED | OUTPATIENT
Start: 2024-07-04 | End: 2024-07-05 | Stop reason: HOSPADM

## 2024-07-03 RX ORDER — CEFAZOLIN SODIUM/WATER 2 G/20 ML
2000 SYRINGE (ML) INTRAVENOUS DAILY
Status: DISCONTINUED | OUTPATIENT
Start: 2024-07-04 | End: 2024-07-05 | Stop reason: HOSPADM

## 2024-07-03 RX ORDER — SIMETHICONE 125 MG
125 TABLET,CHEWABLE ORAL 4 TIMES DAILY PRN
Status: DISCONTINUED | OUTPATIENT
Start: 2024-07-03 | End: 2024-07-05 | Stop reason: HOSPADM

## 2024-07-03 RX ORDER — EZETIMIBE 10 MG/1
10 TABLET ORAL DAILY
Status: DISCONTINUED | OUTPATIENT
Start: 2024-07-04 | End: 2024-07-05 | Stop reason: HOSPADM

## 2024-07-03 RX ORDER — FLUTICASONE FUROATE AND VILANTEROL 100; 25 UG/1; UG/1
1 POWDER RESPIRATORY (INHALATION)
Status: DISCONTINUED | OUTPATIENT
Start: 2024-07-03 | End: 2024-07-05 | Stop reason: HOSPADM

## 2024-07-03 RX ORDER — GABAPENTIN 100 MG/1
100 CAPSULE ORAL EVERY 12 HOURS SCHEDULED
Status: DISCONTINUED | OUTPATIENT
Start: 2024-07-03 | End: 2024-07-05 | Stop reason: HOSPADM

## 2024-07-03 RX ADMIN — GABAPENTIN 100 MG: 100 CAPSULE ORAL at 21:46

## 2024-07-03 RX ADMIN — TRAZODONE HYDROCHLORIDE 50 MG: 50 TABLET ORAL at 21:46

## 2024-07-03 RX ADMIN — HEPARIN SODIUM 5000 UNITS: 5000 INJECTION INTRAVENOUS; SUBCUTANEOUS at 21:46

## 2024-07-03 RX ADMIN — SODIUM CHLORIDE 500 ML: 9 INJECTION, SOLUTION INTRAVENOUS at 11:43

## 2024-07-03 RX ADMIN — FLUTICASONE FUROATE AND VILANTEROL TRIFENATATE 1 PUFF: 100; 25 POWDER RESPIRATORY (INHALATION) at 18:57

## 2024-07-03 RX ADMIN — Medication 6 MG: at 23:08

## 2024-07-03 RX ADMIN — LATANOPROST 1 DROP: 50 SOLUTION/ DROPS OPHTHALMIC at 21:46

## 2024-07-03 RX ADMIN — HEPARIN SODIUM 5000 UNITS: 5000 INJECTION INTRAVENOUS; SUBCUTANEOUS at 17:01

## 2024-07-03 RX ADMIN — CEFTRIAXONE 2000 MG: 10 INJECTION, POWDER, FOR SOLUTION INTRAVENOUS at 12:46

## 2024-07-03 RX ADMIN — ACETAMINOPHEN 1000 MG: 500 TABLET ORAL at 13:30

## 2024-07-03 RX ADMIN — SODIUM CHLORIDE: 9 INJECTION, SOLUTION INTRAVENOUS at 14:46

## 2024-07-03 RX ADMIN — FENTANYL CITRATE 12.5 MCG: 50 INJECTION INTRAMUSCULAR; INTRAVENOUS at 17:01

## 2024-07-03 SDOH — ECONOMIC STABILITY: INCOME INSECURITY: IN THE PAST 12 MONTHS, HAS THE ELECTRIC, GAS, OIL, OR WATER COMPANY THREATENED TO SHUT OFF SERVICE IN YOUR HOME?: NO

## 2024-07-03 SDOH — ECONOMIC STABILITY: GENERAL: WOULD YOU LIKE HELP WITH ANY OF THE FOLLOWING NEEDS?: I DON'T WANT HELP WITH ANY OF THESE

## 2024-07-03 SDOH — HEALTH STABILITY: PHYSICAL HEALTH: DO YOU HAVE DIFFICULTY DRESSING OR BATHING?: NO

## 2024-07-03 SDOH — ECONOMIC STABILITY: FOOD INSECURITY: WITHIN THE PAST 12 MONTHS, THE FOOD YOU BOUGHT JUST DIDN'T LAST AND YOU DIDN'T HAVE MONEY TO GET MORE.: NEVER TRUE

## 2024-07-03 SDOH — HEALTH STABILITY: GENERAL
BECAUSE OF A PHYSICAL, MENTAL, OR EMOTIONAL CONDITION, DO YOU HAVE SERIOUS DIFFICULTY CONCENTRATING, REMEMBERING OR MAKING DECISIONS?: NO

## 2024-07-03 SDOH — SOCIAL STABILITY: SOCIAL NETWORK
HOW OFTEN DO YOU SEE OR TALK TO PEOPLE THAT YOU CARE ABOUT AND FEEL CLOSE TO? (FOR EXAMPLE: TALKING TO FRIENDS ON THE PHONE, VISITING FRIENDS OR FAMILY, GOING TO CHURCH OR CLUB MEETINGS): 1 OR 2 TIMES A WEEK

## 2024-07-03 SDOH — ECONOMIC STABILITY: TRANSPORTATION INSECURITY
IN THE PAST 12 MONTHS, HAS LACK OF RELIABLE TRANSPORTATION KEPT YOU FROM MEDICAL APPOINTMENTS, MEETINGS, WORK OR FROM GETTING THINGS NEEDED FOR DAILY LIVING?: NO

## 2024-07-03 SDOH — ECONOMIC STABILITY: HOUSING INSECURITY: WHAT IS YOUR LIVING SITUATION TODAY?: LONG TERM CARE FACILITY

## 2024-07-03 SDOH — ECONOMIC STABILITY: GENERAL

## 2024-07-03 SDOH — SOCIAL STABILITY: SOCIAL NETWORK: SUPPORT SYSTEMS: FAMILY MEMBERS;CHILDREN

## 2024-07-03 SDOH — ECONOMIC STABILITY: HOUSING INSECURITY: WHAT IS YOUR LIVING SITUATION TODAY?: OTHER FACILITY RESIDENTS

## 2024-07-03 SDOH — SOCIAL STABILITY: SOCIAL INSECURITY: HOW OFTEN DOES ANYONE, INCLUDING FAMILY AND FRIENDS, PHYSICALLY HURT YOU?: NEVER

## 2024-07-03 SDOH — ECONOMIC STABILITY: HOUSING INSECURITY: DO YOU HAVE PROBLEMS WITH ANY OF THE FOLLOWING?: PATIENT DECLINED

## 2024-07-03 SDOH — SOCIAL STABILITY: SOCIAL INSECURITY: HOW OFTEN DOES ANYONE, INCLUDING FAMILY AND FRIENDS, INSULT OR TALK DOWN TO YOU?: NEVER

## 2024-07-03 SDOH — HEALTH STABILITY: PHYSICAL HEALTH: DO YOU HAVE SERIOUS DIFFICULTY WALKING OR CLIMBING STAIRS?: NO

## 2024-07-03 SDOH — ECONOMIC STABILITY: HOUSING INSECURITY: WHAT IS YOUR LIVING SITUATION TODAY?: I HAVE A STEADY PLACE TO LIVE

## 2024-07-03 SDOH — SOCIAL STABILITY: SOCIAL INSECURITY: HOW OFTEN DOES ANYONE, INCLUDING FAMILY AND FRIENDS, SCREAM OR CURSE AT YOU?: NEVER

## 2024-07-03 SDOH — HEALTH STABILITY: GENERAL: BECAUSE OF A PHYSICAL, MENTAL, OR EMOTIONAL CONDITION, DO YOU HAVE DIFFICULTY DOING ERRANDS ALONE?: YES

## 2024-07-03 SDOH — SOCIAL STABILITY: SOCIAL INSECURITY: HOW OFTEN DOES ANYONE, INCLUDING FAMILY AND FRIENDS, THREATEN YOU WITH HARM?: NEVER

## 2024-07-03 ASSESSMENT — ENCOUNTER SYMPTOMS
VOMITING: 0
EYE DISCHARGE: 0
UNEXPECTED WEIGHT CHANGE: 0
ABDOMINAL DISTENTION: 0
CHILLS: 0
SHORTNESS OF BREATH: 1
APNEA: 0
WEAKNESS: 0
DIAPHORESIS: 0
HEADACHES: 0
COLOR CHANGE: 0
CHEST TIGHTNESS: 0
SPEECH DIFFICULTY: 0
TREMORS: 0
NAUSEA: 0
CONSTIPATION: 0
SEIZURES: 0
APPETITE CHANGE: 1
CONFUSION: 0
ABDOMINAL PAIN: 0
NUMBNESS: 0
COUGH: 1
STRIDOR: 0
BACK PAIN: 0
CHOKING: 0
DIZZINESS: 0
SINUS PRESSURE: 0
FATIGUE: 1
SORE THROAT: 0
ACTIVITY CHANGE: 1
BRUISES/BLEEDS EASILY: 0
TROUBLE SWALLOWING: 0
FEVER: 0
WHEEZING: 0
DIARRHEA: 0
LIGHT-HEADEDNESS: 0
WEAKNESS: 1
WOUND: 0
DIZZINESS: 1
CHILLS: 1
RHINORRHEA: 1

## 2024-07-03 ASSESSMENT — PAIN SCALES - GENERAL
PAINLEVEL_OUTOF10: 3
PAINLEVEL_OUTOF10: 6
PAINLEVEL_OUTOF10: 8
PAINLEVEL_OUTOF10: 8
PAINLEVEL_OUTOF10: 9
PAINLEVEL_OUTOF10: 6

## 2024-07-03 ASSESSMENT — ORIENTATION MEMORY CONCENTRATION TEST (OMCT)
OMCT INTERPRETATION: 11 OR GREATER: MODERATE TO SEVERE COGNITIVE IMPAIRMENT
WHAT MONTH IS IT NOW: CORRECT
REPEAT THE NAME AND ADDRESS I ASKED YOU TO REMEMBER: 2 ERRORS
SAY THE MONTHS IN REVERSE ORDER STARTING WITH LAST MONTH: 2 OR MORE ERRORS
WHAT TIME IS IT (NO WATCH OR CLOCK): CORRECT
WHAT YEAR IS IT NOW (MUST BE EXACT): INCORRECT
OMCT SCORE: 14
COUNT BACKWARDS FROM 20 TO 1: 1 ERROR

## 2024-07-03 ASSESSMENT — PATIENT HEALTH QUESTIONNAIRE - PHQ9
SUM OF ALL RESPONSES TO PHQ9 QUESTIONS 1 AND 2: 0
IS PATIENT ABLE TO COMPLETE PHQ2 OR PHQ9: YES
SUM OF ALL RESPONSES TO PHQ9 QUESTIONS 1 AND 2: 0
2. FEELING DOWN, DEPRESSED OR HOPELESS: NOT AT ALL
CLINICAL INTERPRETATION OF PHQ2 SCORE: NO FURTHER SCREENING NEEDED
1. LITTLE INTEREST OR PLEASURE IN DOING THINGS: NOT AT ALL

## 2024-07-03 ASSESSMENT — COLUMBIA-SUICIDE SEVERITY RATING SCALE - C-SSRS
1. WITHIN THE PAST MONTH, HAVE YOU WISHED YOU WERE DEAD OR WISHED YOU COULD GO TO SLEEP AND NOT WAKE UP?: NO
IS THE PATIENT ABLE TO COMPLETE C-SSRS: YES
2. HAVE YOU ACTUALLY HAD ANY THOUGHTS OF KILLING YOURSELF?: NO
6. HAVE YOU EVER DONE ANYTHING, STARTED TO DO ANYTHING, OR PREPARED TO DO ANYTHING TO END YOUR LIFE?: NO

## 2024-07-03 ASSESSMENT — PAIN DESCRIPTION - PAIN TYPE: TYPE: CHEST PAIN

## 2024-07-03 ASSESSMENT — LIFESTYLE VARIABLES
ALCOHOL_USE_STATUS: NO OR LOW RISK WITH VALIDATED TOOL
HOW OFTEN DO YOU HAVE A DRINK CONTAINING ALCOHOL: NEVER
HOW OFTEN DO YOU HAVE 6 OR MORE DRINKS ON ONE OCCASION: NEVER
AUDIT-C TOTAL SCORE: 0
HOW MANY STANDARD DRINKS CONTAINING ALCOHOL DO YOU HAVE ON A TYPICAL DAY: 0,1 OR 2

## 2024-07-03 ASSESSMENT — ACTIVITIES OF DAILY LIVING (ADL)
ADL_SCORE: 12
ADL_BEFORE_ADMISSION: INDEPENDENT
RECENT_DECLINE_ADL: NO
PRIOR_ADL: MODIFIED INDEPENDENT
ADL_SHORT_OF_BREATH: YES

## 2024-07-03 ASSESSMENT — COGNITIVE AND FUNCTIONAL STATUS - GENERAL
BASIC_MOBILITY_RAW_SCORE: 20
BASIC_MOBILITY_CONVERTED_SCORE: 43.99

## 2024-07-04 LAB
ALBUMIN SERPL-MCNC: 3 G/DL (ref 3.6–5.1)
ALBUMIN/GLOB SERPL: 0.8 {RATIO} (ref 1–2.4)
ALP SERPL-CCNC: 48 UNITS/L (ref 45–117)
ALT SERPL-CCNC: 29 UNITS/L
ANION GAP SERPL CALC-SCNC: 14 MMOL/L (ref 7–19)
AST SERPL-CCNC: 34 UNITS/L
BACTERIA UR CULT: NORMAL
BASOPHILS # BLD: 0 K/MCL (ref 0–0.3)
BASOPHILS NFR BLD: 0 %
BILIRUB SERPL-MCNC: 0.2 MG/DL (ref 0.2–1)
BUN SERPL-MCNC: 23 MG/DL (ref 6–20)
BUN/CREAT SERPL: 23 (ref 7–25)
CALCIUM SERPL-MCNC: 8.2 MG/DL (ref 8.4–10.2)
CHLORIDE SERPL-SCNC: 100 MMOL/L (ref 97–110)
CO2 SERPL-SCNC: 21 MMOL/L (ref 21–32)
CREAT SERPL-MCNC: 0.99 MG/DL (ref 0.51–0.95)
DEPRECATED RDW RBC: 49 FL (ref 39–50)
EGFRCR SERPLBLD CKD-EPI 2021: 58 ML/MIN/{1.73_M2}
EOSINOPHIL # BLD: 0 K/MCL (ref 0–0.5)
EOSINOPHIL NFR BLD: 1 %
ERYTHROCYTE [DISTWIDTH] IN BLOOD: 14.2 % (ref 11–15)
FASTING DURATION TIME PATIENT: ABNORMAL H
GLOBULIN SER-MCNC: 3.7 G/DL (ref 2–4)
GLUCOSE SERPL-MCNC: 83 MG/DL (ref 70–99)
HCT VFR BLD CALC: 31.9 % (ref 36–46.5)
HGB BLD-MCNC: 10.6 G/DL (ref 12–15.5)
IMM GRANULOCYTES # BLD AUTO: 0 K/MCL (ref 0–0.2)
IMM GRANULOCYTES # BLD: 0 %
LYMPHOCYTES # BLD: 1.2 K/MCL (ref 1–4)
LYMPHOCYTES NFR BLD: 42 %
MAGNESIUM SERPL-MCNC: 1.7 MG/DL (ref 1.7–2.4)
MCH RBC QN AUTO: 31.1 PG (ref 26–34)
MCHC RBC AUTO-ENTMCNC: 33.2 G/DL (ref 32–36.5)
MCV RBC AUTO: 93.5 FL (ref 78–100)
MONOCYTES # BLD: 0.3 K/MCL (ref 0.3–0.9)
MONOCYTES NFR BLD: 12 %
NEUTROPHILS # BLD: 1.3 K/MCL (ref 1.8–7.7)
NEUTROPHILS NFR BLD: 45 %
NRBC BLD MANUAL-RTO: 0 /100 WBC
PLATELET # BLD AUTO: 212 K/MCL (ref 140–450)
POTASSIUM SERPL-SCNC: 4.4 MMOL/L (ref 3.4–5.1)
PROT SERPL-MCNC: 6.7 G/DL (ref 6.4–8.2)
RAINBOW EXTRA TUBES HOLD SPECIMEN: NORMAL
RBC # BLD: 3.41 MIL/MCL (ref 4–5.2)
SODIUM SERPL-SCNC: 131 MMOL/L (ref 135–145)
WBC # BLD: 2.9 K/MCL (ref 4.2–11)

## 2024-07-04 PROCEDURE — 97166 OT EVAL MOD COMPLEX 45 MIN: CPT

## 2024-07-04 PROCEDURE — 10002803 HB RX 637: Performed by: PHYSICIAN ASSISTANT

## 2024-07-04 PROCEDURE — 10002807 HB RX 258: Performed by: PHYSICIAN ASSISTANT

## 2024-07-04 PROCEDURE — 10002800 HB RX 250 W HCPCS: Performed by: PHYSICIAN ASSISTANT

## 2024-07-04 PROCEDURE — S0310 HOSPITALIST VISIT: HCPCS | Performed by: HOSPITALIST

## 2024-07-04 PROCEDURE — 97535 SELF CARE MNGMENT TRAINING: CPT

## 2024-07-04 PROCEDURE — 80053 COMPREHEN METABOLIC PANEL: CPT | Performed by: PHYSICIAN ASSISTANT

## 2024-07-04 PROCEDURE — 99233 SBSQ HOSP IP/OBS HIGH 50: CPT | Performed by: PHYSICIAN ASSISTANT

## 2024-07-04 PROCEDURE — 94640 AIRWAY INHALATION TREATMENT: CPT

## 2024-07-04 PROCEDURE — 36415 COLL VENOUS BLD VENIPUNCTURE: CPT | Performed by: HOSPITALIST

## 2024-07-04 PROCEDURE — 85025 COMPLETE CBC W/AUTO DIFF WBC: CPT | Performed by: PHYSICIAN ASSISTANT

## 2024-07-04 PROCEDURE — 10000002 HB ROOM CHARGE MED SURG

## 2024-07-04 PROCEDURE — 10004281 HB COUNTER-STAFF TIME PER 15 MIN

## 2024-07-04 PROCEDURE — 96372 THER/PROPH/DIAG INJ SC/IM: CPT | Performed by: PHYSICIAN ASSISTANT

## 2024-07-04 PROCEDURE — 83735 ASSAY OF MAGNESIUM: CPT | Performed by: PHYSICIAN ASSISTANT

## 2024-07-04 RX ORDER — MAGNESIUM SULFATE 1 G/100ML
1 INJECTION INTRAVENOUS ONCE
Status: COMPLETED | OUTPATIENT
Start: 2024-07-04 | End: 2024-07-04

## 2024-07-04 RX ORDER — SPIRONOLACTONE 50 MG/1
50 TABLET, FILM COATED ORAL DAILY
Status: CANCELLED | COMMUNITY
Start: 2024-07-06

## 2024-07-04 RX ORDER — LOSARTAN POTASSIUM 100 MG/1
100 TABLET ORAL DAILY
Status: CANCELLED | COMMUNITY
Start: 2024-07-06

## 2024-07-04 RX ADMIN — VERAPAMIL HYDROCHLORIDE 40 MG: 40 TABLET ORAL at 09:55

## 2024-07-04 RX ADMIN — TRAZODONE HYDROCHLORIDE 50 MG: 50 TABLET ORAL at 20:23

## 2024-07-04 RX ADMIN — EZETIMIBE 10 MG: 10 TABLET ORAL at 09:51

## 2024-07-04 RX ADMIN — HEPARIN SODIUM 5000 UNITS: 5000 INJECTION INTRAVENOUS; SUBCUTANEOUS at 14:45

## 2024-07-04 RX ADMIN — Medication 6 MG: at 22:04

## 2024-07-04 RX ADMIN — FLUTICASONE FUROATE AND VILANTEROL TRIFENATATE 1 PUFF: 100; 25 POWDER RESPIRATORY (INHALATION) at 08:09

## 2024-07-04 RX ADMIN — SODIUM CHLORIDE: 9 INJECTION, SOLUTION INTRAVENOUS at 13:34

## 2024-07-04 RX ADMIN — MAGNESIUM SULFATE IN DEXTROSE 1 G: 10 INJECTION, SOLUTION INTRAVENOUS at 09:46

## 2024-07-04 RX ADMIN — CEFTRIAXONE SODIUM 2000 MG: 10 INJECTION, POWDER, FOR SOLUTION INTRAVENOUS at 09:45

## 2024-07-04 RX ADMIN — GABAPENTIN 100 MG: 100 CAPSULE ORAL at 09:51

## 2024-07-04 RX ADMIN — GABAPENTIN 100 MG: 100 CAPSULE ORAL at 20:23

## 2024-07-04 RX ADMIN — HEPARIN SODIUM 5000 UNITS: 5000 INJECTION INTRAVENOUS; SUBCUTANEOUS at 06:24

## 2024-07-04 RX ADMIN — HEPARIN SODIUM 5000 UNITS: 5000 INJECTION INTRAVENOUS; SUBCUTANEOUS at 20:23

## 2024-07-04 RX ADMIN — LEVOTHYROXINE SODIUM 25 MCG: 0.03 TABLET ORAL at 06:24

## 2024-07-04 RX ADMIN — VERAPAMIL HYDROCHLORIDE 40 MG: 40 TABLET ORAL at 20:23

## 2024-07-04 RX ADMIN — LATANOPROST 1 DROP: 50 SOLUTION/ DROPS OPHTHALMIC at 20:26

## 2024-07-04 ASSESSMENT — ACTIVITIES OF DAILY LIVING (ADL): HOME_MANAGEMENT_TIME_ENTRY: 23

## 2024-07-04 ASSESSMENT — PAIN SCALES - GENERAL
PAINLEVEL_OUTOF10: 0

## 2024-07-04 ASSESSMENT — COGNITIVE AND FUNCTIONAL STATUS - GENERAL
DAILY_ACTIVITY_CONVERTED_SCORE: 44.27
HELP NEEDED FOR BATHING: A LITTLE
HELP NEEDED FOR TOILETING: A LITTLE
HELP NEEDED DRESSING REGULAR LOWER BODY CLOTHING: A LITTLE
DAILY_ACTIVITY_RAW_SCORE: 21

## 2024-07-05 VITALS
TEMPERATURE: 97.9 F | DIASTOLIC BLOOD PRESSURE: 81 MMHG | SYSTOLIC BLOOD PRESSURE: 160 MMHG | HEIGHT: 68 IN | BODY MASS INDEX: 18.51 KG/M2 | OXYGEN SATURATION: 99 % | WEIGHT: 122.14 LBS | HEART RATE: 68 BPM | RESPIRATION RATE: 19 BRPM

## 2024-07-05 LAB
ANION GAP SERPL CALC-SCNC: 13 MMOL/L (ref 7–19)
BASOPHILS # BLD: 0 K/MCL (ref 0–0.3)
BASOPHILS NFR BLD: 0 %
BUN SERPL-MCNC: 16 MG/DL (ref 6–20)
BUN/CREAT SERPL: 20 (ref 7–25)
CALCIUM SERPL-MCNC: 8.7 MG/DL (ref 8.4–10.2)
CHLORIDE SERPL-SCNC: 102 MMOL/L (ref 97–110)
CO2 SERPL-SCNC: 23 MMOL/L (ref 21–32)
CREAT SERPL-MCNC: 0.82 MG/DL (ref 0.51–0.95)
DEPRECATED RDW RBC: 49.2 FL (ref 39–50)
EGFRCR SERPLBLD CKD-EPI 2021: 73 ML/MIN/{1.73_M2}
EOSINOPHIL # BLD: 0 K/MCL (ref 0–0.5)
EOSINOPHIL NFR BLD: 0 %
ERYTHROCYTE [DISTWIDTH] IN BLOOD: 14.2 % (ref 11–15)
FASTING DURATION TIME PATIENT: ABNORMAL H
GLUCOSE SERPL-MCNC: 89 MG/DL (ref 70–99)
HCT VFR BLD CALC: 31 % (ref 36–46.5)
HGB BLD-MCNC: 10.1 G/DL (ref 12–15.5)
IMM GRANULOCYTES # BLD AUTO: 0 K/MCL (ref 0–0.2)
IMM GRANULOCYTES # BLD: 0 %
LYMPHOCYTES # BLD: 1 K/MCL (ref 1–4)
LYMPHOCYTES NFR BLD: 39 %
MCH RBC QN AUTO: 30.7 PG (ref 26–34)
MCHC RBC AUTO-ENTMCNC: 32.6 G/DL (ref 32–36.5)
MCV RBC AUTO: 94.2 FL (ref 78–100)
MONOCYTES # BLD: 0.3 K/MCL (ref 0.3–0.9)
MONOCYTES NFR BLD: 12 %
NEUTROPHILS # BLD: 1.2 K/MCL (ref 1.8–7.7)
NEUTROPHILS NFR BLD: 49 %
NRBC BLD MANUAL-RTO: 0 /100 WBC
PLATELET # BLD AUTO: 210 K/MCL (ref 140–450)
POTASSIUM SERPL-SCNC: 4.8 MMOL/L (ref 3.4–5.1)
RAINBOW EXTRA TUBES HOLD SPECIMEN: NORMAL
RBC # BLD: 3.29 MIL/MCL (ref 4–5.2)
SODIUM SERPL-SCNC: 133 MMOL/L (ref 135–145)
WBC # BLD: 2.5 K/MCL (ref 4.2–11)

## 2024-07-05 PROCEDURE — 96372 THER/PROPH/DIAG INJ SC/IM: CPT | Performed by: PHYSICIAN ASSISTANT

## 2024-07-05 PROCEDURE — 80048 BASIC METABOLIC PNL TOTAL CA: CPT | Performed by: PHYSICIAN ASSISTANT

## 2024-07-05 PROCEDURE — 99239 HOSP IP/OBS DSCHRG MGMT >30: CPT | Performed by: PHYSICIAN ASSISTANT

## 2024-07-05 PROCEDURE — 10002803 HB RX 637: Performed by: PHYSICIAN ASSISTANT

## 2024-07-05 PROCEDURE — 36415 COLL VENOUS BLD VENIPUNCTURE: CPT | Performed by: PHYSICIAN ASSISTANT

## 2024-07-05 PROCEDURE — 10004651 HB RX, NO CHARGE ITEM: Performed by: PHYSICIAN ASSISTANT

## 2024-07-05 PROCEDURE — 10002801 HB RX 250 W/O HCPCS: Performed by: PHYSICIAN ASSISTANT

## 2024-07-05 PROCEDURE — S0310 HOSPITALIST VISIT: HCPCS | Performed by: HOSPITALIST

## 2024-07-05 PROCEDURE — 10002800 HB RX 250 W HCPCS: Performed by: PHYSICIAN ASSISTANT

## 2024-07-05 PROCEDURE — 85025 COMPLETE CBC W/AUTO DIFF WBC: CPT | Performed by: PHYSICIAN ASSISTANT

## 2024-07-05 PROCEDURE — 94640 AIRWAY INHALATION TREATMENT: CPT

## 2024-07-05 RX ORDER — VERAPAMIL HYDROCHLORIDE 80 MG/1
80 TABLET ORAL 2 TIMES DAILY
Qty: 60 TABLET | Refills: 0 | Status: SHIPPED | OUTPATIENT
Start: 2024-07-08 | End: 2024-07-05

## 2024-07-05 RX ORDER — EZETIMIBE 10 MG/1
10 TABLET ORAL DAILY
Qty: 30 TABLET | Refills: 0 | Status: SHIPPED | OUTPATIENT
Start: 2024-07-05

## 2024-07-05 RX ORDER — VERAPAMIL HYDROCHLORIDE 80 MG/1
80 TABLET ORAL 2 TIMES DAILY
Qty: 60 TABLET | Refills: 0 | Status: SHIPPED | OUTPATIENT
Start: 2024-07-11

## 2024-07-05 RX ORDER — TRAZODONE HYDROCHLORIDE 100 MG/1
100 TABLET ORAL AT BEDTIME
Qty: 30 TABLET | Refills: 0 | Status: SHIPPED | OUTPATIENT
Start: 2024-07-08 | End: 2024-07-05

## 2024-07-05 RX ORDER — TRAZODONE HYDROCHLORIDE 100 MG/1
100 TABLET ORAL AT BEDTIME
Qty: 30 TABLET | Refills: 0 | Status: SHIPPED | OUTPATIENT
Start: 2024-07-11

## 2024-07-05 RX ORDER — CEFUROXIME AXETIL 500 MG/1
500 TABLET ORAL 2 TIMES DAILY
Qty: 8 TABLET | Refills: 0 | Status: SHIPPED | OUTPATIENT
Start: 2024-07-05 | End: 2024-07-09

## 2024-07-05 RX ORDER — LEVOTHYROXINE SODIUM 0.03 MG/1
25 TABLET ORAL DAILY
Qty: 30 TABLET | Refills: 0 | Status: SHIPPED | OUTPATIENT
Start: 2024-07-05

## 2024-07-05 RX ORDER — VIBEGRON 75 MG/1
1 TABLET, FILM COATED ORAL DAILY
Status: SHIPPED | COMMUNITY
Start: 2024-07-08

## 2024-07-05 RX ORDER — VERAPAMIL HYDROCHLORIDE 80 MG/1
80 TABLET ORAL 2 TIMES DAILY
Qty: 60 TABLET | Refills: 0 | Status: SHIPPED | OUTPATIENT
Start: 2024-07-05 | End: 2024-07-05

## 2024-07-05 RX ORDER — TRAZODONE HYDROCHLORIDE 100 MG/1
100 TABLET ORAL AT BEDTIME
Qty: 30 TABLET | Refills: 0 | Status: SHIPPED | OUTPATIENT
Start: 2024-07-08 | End: 2024-07-05 | Stop reason: HOSPADM

## 2024-07-05 RX ORDER — TRAZODONE HYDROCHLORIDE 100 MG/1
100 TABLET ORAL AT BEDTIME
Qty: 30 TABLET | Refills: 0 | Status: SHIPPED | OUTPATIENT
Start: 2024-07-05 | End: 2024-07-05

## 2024-07-05 RX ORDER — VERAPAMIL HYDROCHLORIDE 80 MG/1
80 TABLET ORAL 2 TIMES DAILY
Status: SHIPPED | COMMUNITY
Start: 2024-07-08 | End: 2024-07-05 | Stop reason: HOSPADM

## 2024-07-05 RX ORDER — HYDROCODONE BITARTRATE AND ACETAMINOPHEN 5; 325 MG/1; MG/1
1 TABLET ORAL 2 TIMES DAILY PRN
Status: SHIPPED | COMMUNITY
Start: 2024-07-05

## 2024-07-05 RX ADMIN — VERAPAMIL HYDROCHLORIDE 40 MG: 40 TABLET ORAL at 09:13

## 2024-07-05 RX ADMIN — FLUTICASONE FUROATE AND VILANTEROL TRIFENATATE 1 PUFF: 100; 25 POWDER RESPIRATORY (INHALATION) at 08:37

## 2024-07-05 RX ADMIN — LEVOTHYROXINE SODIUM 25 MCG: 0.03 TABLET ORAL at 06:37

## 2024-07-05 RX ADMIN — GABAPENTIN 100 MG: 100 CAPSULE ORAL at 09:13

## 2024-07-05 RX ADMIN — ACETAMINOPHEN 650 MG: 325 TABLET ORAL at 09:11

## 2024-07-05 RX ADMIN — CEFTRIAXONE SODIUM 2000 MG: 10 INJECTION, POWDER, FOR SOLUTION INTRAVENOUS at 09:14

## 2024-07-05 RX ADMIN — HEPARIN SODIUM 5000 UNITS: 5000 INJECTION INTRAVENOUS; SUBCUTANEOUS at 06:37

## 2024-07-05 RX ADMIN — EZETIMIBE 10 MG: 10 TABLET ORAL at 09:12

## 2024-07-05 SDOH — ECONOMIC STABILITY: GENERAL

## 2024-07-05 SDOH — ECONOMIC STABILITY: HOUSING INSECURITY: WHAT IS YOUR LIVING SITUATION TODAY?: I HAVE A STEADY PLACE TO LIVE

## 2024-07-05 SDOH — ECONOMIC STABILITY: HOUSING INSECURITY: DO YOU HAVE PROBLEMS WITH ANY OF THE FOLLOWING?: PATIENT DECLINED

## 2024-07-05 ASSESSMENT — COGNITIVE AND FUNCTIONAL STATUS - GENERAL
BECAUSE OF A PHYSICAL, MENTAL, OR EMOTIONAL CONDITION, DO YOU HAVE SERIOUS DIFFICULTY CONCENTRATING, REMEMBERING OR MAKING DECISIONS: NO
DO YOU HAVE SERIOUS DIFFICULTY WALKING OR CLIMBING STAIRS: NO
BECAUSE OF A PHYSICAL, MENTAL, OR EMOTIONAL CONDITION, DO YOU HAVE DIFFICULTY DOING ERRANDS ALONE: YES

## 2024-07-05 ASSESSMENT — PAIN SCALES - GENERAL: PAINLEVEL_OUTOF10: 0

## 2024-07-06 LAB
BACTERIA BLD CULT: NORMAL
BACTERIA BLD CULT: NORMAL

## 2024-07-08 ENCOUNTER — TELEPHONE (OUTPATIENT)
Dept: FAMILY MEDICINE | Age: 79
End: 2024-07-08

## 2024-07-08 ENCOUNTER — CASE MANAGEMENT (OUTPATIENT)
Dept: CARE COORDINATION | Age: 79
End: 2024-07-08

## 2024-07-08 LAB
BACTERIA BLD CULT: NORMAL
BACTERIA BLD CULT: NORMAL

## 2024-07-09 ENCOUNTER — TELEPHONE (OUTPATIENT)
Dept: FAMILY MEDICINE | Age: 79
End: 2024-07-09

## 2024-07-09 ENCOUNTER — NURSE TRIAGE (OUTPATIENT)
Dept: TELEHEALTH | Age: 79
End: 2024-07-09

## 2024-07-09 ENCOUNTER — APPOINTMENT (OUTPATIENT)
Dept: ULTRASOUND IMAGING | Age: 79
End: 2024-07-09
Attending: FAMILY MEDICINE

## 2024-07-09 DIAGNOSIS — B37.9 YEAST INFECTION: Primary | ICD-10-CM

## 2024-07-09 RX ORDER — FLUCONAZOLE 150 MG/1
TABLET ORAL
Qty: 2 TABLET | Refills: 0 | Status: SHIPPED | OUTPATIENT
Start: 2024-07-09

## 2024-07-11 ENCOUNTER — TELEPHONE (OUTPATIENT)
Dept: FAMILY MEDICINE | Age: 79
End: 2024-07-11

## 2024-07-11 DIAGNOSIS — G50.0 TRIGEMINAL NEURALGIA: ICD-10-CM

## 2024-07-11 DIAGNOSIS — I71.40 ABDOMINAL AORTIC ANEURYSM (AAA) WITHOUT RUPTURE, UNSPECIFIED PART (CMD): Primary | ICD-10-CM

## 2024-07-11 RX ORDER — GABAPENTIN 100 MG/1
100 CAPSULE ORAL 2 TIMES DAILY
Qty: 60 CAPSULE | Refills: 1 | Status: SHIPPED | OUTPATIENT
Start: 2024-07-11 | End: 2024-09-09

## 2024-07-11 RX ORDER — SPIRONOLACTONE 50 MG/1
50 TABLET, FILM COATED ORAL DAILY
Qty: 30 TABLET | Refills: 0 | Status: SHIPPED | OUTPATIENT
Start: 2024-07-11

## 2024-07-11 RX ORDER — LATANOPROST 50 UG/ML
SOLUTION/ DROPS OPHTHALMIC
Qty: 2.5 EACH | Refills: 0 | Status: SHIPPED | OUTPATIENT
Start: 2024-07-11

## 2024-07-11 RX ORDER — VIBEGRON 75 MG/1
1 TABLET, FILM COATED ORAL DAILY
Qty: 30 TABLET | Refills: 0 | OUTPATIENT
Start: 2024-07-11

## 2024-07-11 RX ORDER — LOSARTAN POTASSIUM 100 MG/1
100 TABLET ORAL DAILY
Qty: 30 TABLET | Refills: 0 | Status: SHIPPED | OUTPATIENT
Start: 2024-07-11

## 2024-07-15 ENCOUNTER — TELEPHONE (OUTPATIENT)
Dept: FAMILY MEDICINE | Age: 79
End: 2024-07-15

## 2024-07-15 ENCOUNTER — TELEPHONE (OUTPATIENT)
Dept: OPHTHALMOLOGY | Age: 79
End: 2024-07-15

## 2024-07-15 ENCOUNTER — APPOINTMENT (OUTPATIENT)
Dept: ULTRASOUND IMAGING | Age: 79
End: 2024-07-15
Attending: FAMILY MEDICINE

## 2024-07-15 ENCOUNTER — APPOINTMENT (OUTPATIENT)
Dept: FAMILY MEDICINE | Age: 79
End: 2024-07-15

## 2024-07-15 DIAGNOSIS — G50.0 TRIGEMINAL NEURALGIA: ICD-10-CM

## 2024-07-15 DIAGNOSIS — I71.40 ABDOMINAL AORTIC ANEURYSM (AAA) WITHOUT RUPTURE, UNSPECIFIED PART (CMD): ICD-10-CM

## 2024-07-15 PROCEDURE — 93978 VASCULAR STUDY: CPT | Performed by: RADIOLOGY

## 2024-07-16 ENCOUNTER — E-ADVICE (OUTPATIENT)
Dept: FAMILY MEDICINE | Age: 79
End: 2024-07-16

## 2024-07-16 ENCOUNTER — APPOINTMENT (OUTPATIENT)
Dept: FAMILY MEDICINE | Age: 79
End: 2024-07-16

## 2024-07-16 RX ORDER — HYDROCODONE BITARTRATE AND ACETAMINOPHEN 5; 325 MG/1; MG/1
TABLET ORAL
Qty: 14 TABLET | OUTPATIENT
Start: 2024-07-16

## 2024-07-16 RX ORDER — HYDROCODONE BITARTRATE AND ACETAMINOPHEN 5; 325 MG/1; MG/1
TABLET ORAL
Qty: 14 TABLET | Refills: 0 | Status: SHIPPED | OUTPATIENT
Start: 2024-07-16 | End: 2024-07-23

## 2024-07-18 ENCOUNTER — OFFICE VISIT (OUTPATIENT)
Dept: FAMILY MEDICINE | Age: 79
End: 2024-07-18

## 2024-07-18 ENCOUNTER — TELEPHONE (OUTPATIENT)
Dept: FAMILY MEDICINE | Age: 79
End: 2024-07-18

## 2024-07-18 VITALS
HEIGHT: 68 IN | WEIGHT: 123.2 LBS | DIASTOLIC BLOOD PRESSURE: 70 MMHG | OXYGEN SATURATION: 98 % | SYSTOLIC BLOOD PRESSURE: 128 MMHG | RESPIRATION RATE: 18 BRPM | BODY MASS INDEX: 18.67 KG/M2 | HEART RATE: 64 BPM

## 2024-07-18 DIAGNOSIS — H04.123 BILATERAL DRY EYES: ICD-10-CM

## 2024-07-18 DIAGNOSIS — Z09 HOSPITAL DISCHARGE FOLLOW-UP: Primary | ICD-10-CM

## 2024-07-18 DIAGNOSIS — I71.40 ABDOMINAL AORTIC ANEURYSM (AAA) WITHOUT RUPTURE, UNSPECIFIED PART (CMD): ICD-10-CM

## 2024-07-18 DIAGNOSIS — M25.579 PAIN IN JOINT INVOLVING ANKLE AND FOOT, UNSPECIFIED LATERALITY: ICD-10-CM

## 2024-07-18 DIAGNOSIS — R29.898 WEAKNESS OF BOTH LOWER EXTREMITIES: ICD-10-CM

## 2024-07-18 DIAGNOSIS — B37.9 YEAST INFECTION: ICD-10-CM

## 2024-07-18 DIAGNOSIS — N89.8 VAGINAL DRYNESS: ICD-10-CM

## 2024-07-18 DIAGNOSIS — K58.9 IRRITABLE BOWEL SYNDROME, UNSPECIFIED TYPE: ICD-10-CM

## 2024-07-18 DIAGNOSIS — G62.9 NEUROPATHY: ICD-10-CM

## 2024-07-18 DIAGNOSIS — G47.00 INSOMNIA, UNSPECIFIED TYPE: ICD-10-CM

## 2024-07-18 PROCEDURE — 99495 TRANSJ CARE MGMT MOD F2F 14D: CPT | Performed by: FAMILY MEDICINE

## 2024-07-18 RX ORDER — ESTRADIOL 0.1 MG/G
CREAM VAGINAL
Qty: 42.5 G | Refills: 12 | Status: SHIPPED | OUTPATIENT
Start: 2024-07-18

## 2024-07-18 RX ORDER — ESTRADIOL 0.1 MG/G
1 CREAM VAGINAL DAILY
Qty: 42.5 G | Refills: 3 | Status: CANCELLED | OUTPATIENT
Start: 2024-07-18

## 2024-07-18 RX ORDER — ESTRADIOL 0.1 MG/G
2 CREAM VAGINAL DAILY
Qty: 42.5 G | Refills: 12 | Status: CANCELLED | OUTPATIENT
Start: 2024-07-18

## 2024-07-18 RX ORDER — FLUCONAZOLE 150 MG/1
TABLET ORAL
Qty: 4 TABLET | Refills: 0 | Status: SHIPPED | OUTPATIENT
Start: 2024-07-18

## 2024-07-18 RX ORDER — POLYETHYLENE GLYCOL 3350 17 G/17G
POWDER, FOR SOLUTION ORAL
Qty: 1 EACH | Refills: 0 | Status: SHIPPED | OUTPATIENT
Start: 2024-07-18

## 2024-07-18 RX ORDER — ESTRADIOL 0.1 MG/G
CREAM VAGINAL
Qty: 42.5 EACH | Refills: 0 | Status: CANCELLED | OUTPATIENT
Start: 2024-07-18

## 2024-07-18 RX ORDER — TRAZODONE HYDROCHLORIDE 100 MG/1
TABLET ORAL
Qty: 45 TABLET | Refills: 1 | Status: SHIPPED | OUTPATIENT
Start: 2024-07-18

## 2024-07-18 ASSESSMENT — PATIENT HEALTH QUESTIONNAIRE - PHQ9
SUM OF ALL RESPONSES TO PHQ9 QUESTIONS 1 AND 2: 0
CLINICAL INTERPRETATION OF PHQ2 SCORE: NO FURTHER SCREENING NEEDED
SUM OF ALL RESPONSES TO PHQ9 QUESTIONS 1 AND 2: 0
2. FEELING DOWN, DEPRESSED OR HOPELESS: NOT AT ALL
1. LITTLE INTEREST OR PLEASURE IN DOING THINGS: NOT AT ALL

## 2024-07-19 ENCOUNTER — TELEPHONE (OUTPATIENT)
Dept: FAMILY MEDICINE | Age: 79
End: 2024-07-19

## 2024-07-23 ENCOUNTER — E-ADVICE (OUTPATIENT)
Dept: FAMILY MEDICINE | Age: 79
End: 2024-07-23

## 2024-07-23 DIAGNOSIS — K58.9 IRRITABLE BOWEL SYNDROME, UNSPECIFIED TYPE: ICD-10-CM

## 2024-07-24 RX ORDER — POLYETHYLENE GLYCOL 3350 17 G/17G
POWDER, FOR SOLUTION ORAL
Qty: 238 EACH | Refills: 0 | OUTPATIENT
Start: 2024-07-24

## 2024-07-30 ENCOUNTER — TELEPHONE (OUTPATIENT)
Dept: FAMILY MEDICINE | Age: 79
End: 2024-07-30

## 2024-08-05 ENCOUNTER — E-ADVICE (OUTPATIENT)
Dept: FAMILY MEDICINE | Age: 79
End: 2024-08-05

## 2024-08-05 RX ORDER — VIBEGRON 75 MG/1
1 TABLET, FILM COATED ORAL DAILY
Qty: 30 TABLET | Refills: 0 | Status: SHIPPED | OUTPATIENT
Start: 2024-08-05

## 2024-08-06 DIAGNOSIS — G47.00 INSOMNIA, UNSPECIFIED TYPE: ICD-10-CM

## 2024-08-06 RX ORDER — TRAZODONE HYDROCHLORIDE 100 MG/1
TABLET ORAL
Qty: 45 TABLET | Refills: 0 | Status: SHIPPED | OUTPATIENT
Start: 2024-08-06

## 2024-08-06 RX ORDER — VERAPAMIL HYDROCHLORIDE 80 MG/1
80 TABLET ORAL 2 TIMES DAILY
Qty: 60 TABLET | Refills: 0 | Status: SHIPPED | OUTPATIENT
Start: 2024-08-06

## 2024-08-07 ENCOUNTER — HOSPITAL ENCOUNTER (OUTPATIENT)
Dept: CARDIOLOGY | Age: 79
Discharge: HOME | End: 2024-08-07
Attending: FAMILY MEDICINE

## 2024-08-07 ENCOUNTER — APPOINTMENT (OUTPATIENT)
Dept: FAMILY MEDICINE | Age: 79
End: 2024-08-07

## 2024-08-07 DIAGNOSIS — I71.40 ABDOMINAL AORTIC ANEURYSM (AAA) WITHOUT RUPTURE, UNSPECIFIED PART (CMD): ICD-10-CM

## 2024-08-07 PROCEDURE — 93306 TTE W/DOPPLER COMPLETE: CPT

## 2024-08-07 PROCEDURE — 93306 TTE W/DOPPLER COMPLETE: CPT | Performed by: INTERNAL MEDICINE

## 2024-08-08 LAB
AORTIC VALVE AREA: 2.79 CM²
ASCENDING AORTA (AAD): 3.7 CM
AV MEAN GRADIENT (AVMG): 4.84 MMHG
AV PEAK GRADIENT (AVPG): 7.89 MMHG
AV PEAK VELOCITY (AVPV): 1.4 M/S
AVI LVOT PEAK GRADIENT (LVOTMG): 3.56 MMHG
DOP CALC LVOT PEAK VEL (LVOTPV): 1.2 M/S
E WAVE DECELARATION TIME (MDT): 0.27 S
EST RIGHT VENT SYSTOLIC PRESSURE BY TRICUSPID REGURGITATION JET (RVSP): 30.64 MMHG
INTERVENTRICULAR SEPTUM IN END DIASTOLE (IVSD): 1.08 CM
LEFT INTERNAL DIMENSION IN SYSTOLE (LVSD): 2.81 CM
LEFT VENTRICULAR INTERNAL DIMENSION IN DIASTOLE (LVDD): 4.16 CM
LEFT VENTRICULAR POSTERIOR WALL IN END DIASTOLE (LVPW): 1.05 CM
LV EF: 69 %
LVOT 2D (LVOTD): 1.91 CM
LVOT VTI (LVOTVTI): 29.4 CM
MV E TISSUE VEL LAT (MELV): 0 M/S
MV E TISSUE VEL MED (MESV): 0 M/S
MV E WAVE VEL/E TISSUE VEL MED(MSR): 8.98 UNITLESS
MV PEAK A VELOCITY (MVPAV): 0.8 M/S
MV PEAK E VELOCITY (MVPEV): 0.5 M/S
PV PEAK VELOCITY (PVPV): 1.2 M/S
RV TISSUE DOPPLER FREE WALL HEART RATE (RVSTV): 0 M/S
SINUSES OF VALSALVA (SVD): 3.21 CM
TRICUSPID ANNULAR PLANE SYSTOLIC EXCURSION (TAPSE): 1.7 CM
TRICUSPID VALVE ANNULAR PEAK VELOCITY (TVAPV): 0.1 M/S
TRICUSPID VALVE PEAK REGURGITATION VELOCITY (TRPV): 2.6 M/S
TV ESTIMATED RIGHT ARTERIAL PRESSURE (RAP): 3 MMHG

## 2024-08-08 RX ORDER — GABAPENTIN 100 MG/1
CAPSULE ORAL
Qty: 60 CAPSULE | Refills: 0 | OUTPATIENT
Start: 2024-08-08

## 2024-08-08 RX ORDER — SPIRONOLACTONE 50 MG/1
50 TABLET, FILM COATED ORAL DAILY
Qty: 30 TABLET | Refills: 1 | Status: SHIPPED | OUTPATIENT
Start: 2024-08-08

## 2024-08-13 ENCOUNTER — TELEPHONE (OUTPATIENT)
Dept: FAMILY MEDICINE | Age: 79
End: 2024-08-13

## 2024-08-13 DIAGNOSIS — I71.40 ABDOMINAL AORTIC ANEURYSM (AAA) WITHOUT RUPTURE, UNSPECIFIED PART (CMD): Primary | ICD-10-CM

## 2024-08-20 RX ORDER — ACETAMINOPHEN 325 MG/1
650 TABLET ORAL EVERY 6 HOURS PRN
Qty: 60 TABLET | Refills: 5 | Status: SHIPPED | OUTPATIENT
Start: 2024-08-20

## 2024-09-03 DIAGNOSIS — G47.00 INSOMNIA, UNSPECIFIED TYPE: ICD-10-CM

## 2024-09-03 RX ORDER — TRAZODONE HYDROCHLORIDE 100 MG/1
TABLET ORAL
Qty: 45 TABLET | Refills: 0 | Status: SHIPPED | OUTPATIENT
Start: 2024-09-03

## 2024-09-03 RX ORDER — VERAPAMIL HYDROCHLORIDE 80 MG/1
80 TABLET ORAL 2 TIMES DAILY
Qty: 60 TABLET | Refills: 0 | Status: SHIPPED | OUTPATIENT
Start: 2024-09-03

## 2024-09-04 RX ORDER — VIBEGRON 75 MG/1
1 TABLET, FILM COATED ORAL DAILY
Qty: 30 TABLET | Refills: 0 | Status: SHIPPED | OUTPATIENT
Start: 2024-09-04

## 2024-09-04 RX ORDER — SPIRONOLACTONE 50 MG/1
50 TABLET, FILM COATED ORAL DAILY
Qty: 30 TABLET | Refills: 0 | Status: SHIPPED | OUTPATIENT
Start: 2024-09-04

## 2024-10-01 DIAGNOSIS — G47.00 INSOMNIA, UNSPECIFIED TYPE: ICD-10-CM

## 2024-10-01 RX ORDER — LOSARTAN POTASSIUM 100 MG/1
100 TABLET ORAL DAILY
Qty: 30 TABLET | Refills: 0 | Status: SHIPPED | OUTPATIENT
Start: 2024-10-01

## 2024-10-01 RX ORDER — TRAZODONE HYDROCHLORIDE 100 MG/1
TABLET ORAL
Qty: 45 TABLET | Refills: 0 | Status: SHIPPED | OUTPATIENT
Start: 2024-10-01

## 2024-10-01 RX ORDER — LEVOTHYROXINE SODIUM 25 UG/1
25 TABLET ORAL DAILY
Qty: 30 TABLET | Refills: 0 | Status: SHIPPED | OUTPATIENT
Start: 2024-10-01

## 2024-10-01 RX ORDER — VERAPAMIL HYDROCHLORIDE 80 MG/1
80 TABLET ORAL 2 TIMES DAILY
Qty: 60 TABLET | Refills: 0 | Status: SHIPPED | OUTPATIENT
Start: 2024-10-01

## 2024-10-01 RX ORDER — EZETIMIBE 10 MG/1
10 TABLET ORAL DAILY
Qty: 30 TABLET | Refills: 0 | Status: SHIPPED | OUTPATIENT
Start: 2024-10-01

## 2024-10-07 RX ORDER — VIBEGRON 75 MG/1
1 TABLET, FILM COATED ORAL DAILY
Qty: 90 TABLET | Refills: 1 | Status: SHIPPED | OUTPATIENT
Start: 2024-10-07

## 2024-10-22 ENCOUNTER — APPOINTMENT (OUTPATIENT)
Dept: NEUROLOGY | Age: 79
End: 2024-10-22

## 2024-11-05 DIAGNOSIS — G47.00 INSOMNIA, UNSPECIFIED TYPE: ICD-10-CM

## 2024-11-05 RX ORDER — TRAZODONE HYDROCHLORIDE 100 MG/1
TABLET ORAL
Qty: 45 TABLET | Refills: 0 | Status: SHIPPED | OUTPATIENT
Start: 2024-11-05

## 2024-11-05 RX ORDER — SPIRONOLACTONE 50 MG/1
50 TABLET, FILM COATED ORAL DAILY
Qty: 30 TABLET | Refills: 0 | Status: SHIPPED | OUTPATIENT
Start: 2024-11-05

## 2024-11-05 RX ORDER — VERAPAMIL HYDROCHLORIDE 80 MG/1
80 TABLET ORAL 2 TIMES DAILY
Qty: 60 TABLET | Refills: 0 | Status: SHIPPED | OUTPATIENT
Start: 2024-11-05

## 2024-11-05 RX ORDER — LEVOTHYROXINE SODIUM 25 UG/1
25 TABLET ORAL DAILY
Qty: 30 TABLET | Refills: 0 | Status: SHIPPED | OUTPATIENT
Start: 2024-11-05

## 2024-11-05 RX ORDER — EZETIMIBE 10 MG/1
10 TABLET ORAL DAILY
Qty: 30 TABLET | Refills: 0 | Status: SHIPPED | OUTPATIENT
Start: 2024-11-05

## 2024-11-05 RX ORDER — LOSARTAN POTASSIUM 100 MG/1
100 TABLET ORAL DAILY
Qty: 30 TABLET | Refills: 0 | Status: SHIPPED | OUTPATIENT
Start: 2024-11-05

## 2024-12-03 DIAGNOSIS — G47.00 INSOMNIA, UNSPECIFIED TYPE: ICD-10-CM

## 2024-12-03 RX ORDER — LOSARTAN POTASSIUM 100 MG/1
100 TABLET ORAL DAILY
Qty: 30 TABLET | Refills: 0 | Status: SHIPPED | OUTPATIENT
Start: 2024-12-03

## 2024-12-03 RX ORDER — SPIRONOLACTONE 50 MG/1
50 TABLET, FILM COATED ORAL DAILY
Qty: 30 TABLET | Refills: 0 | Status: SHIPPED | OUTPATIENT
Start: 2024-12-03

## 2024-12-03 RX ORDER — LEVOTHYROXINE SODIUM 25 UG/1
25 TABLET ORAL DAILY
Qty: 30 TABLET | Refills: 0 | Status: SHIPPED | OUTPATIENT
Start: 2024-12-03

## 2024-12-03 RX ORDER — VERAPAMIL HYDROCHLORIDE 80 MG/1
80 TABLET ORAL 2 TIMES DAILY
Qty: 60 TABLET | Refills: 0 | Status: SHIPPED | OUTPATIENT
Start: 2024-12-03

## 2024-12-03 RX ORDER — EZETIMIBE 10 MG/1
10 TABLET ORAL DAILY
Qty: 30 TABLET | Refills: 0 | Status: SHIPPED | OUTPATIENT
Start: 2024-12-03

## 2024-12-03 RX ORDER — TRAZODONE HYDROCHLORIDE 100 MG/1
TABLET ORAL
Qty: 45 TABLET | Refills: 0 | Status: SHIPPED | OUTPATIENT
Start: 2024-12-03

## 2024-12-31 DIAGNOSIS — G47.00 INSOMNIA, UNSPECIFIED TYPE: ICD-10-CM

## 2024-12-31 RX ORDER — SPIRONOLACTONE 50 MG/1
50 TABLET, FILM COATED ORAL DAILY
Qty: 30 TABLET | Refills: 0 | OUTPATIENT
Start: 2024-12-31

## 2024-12-31 RX ORDER — LEVOTHYROXINE SODIUM 25 UG/1
25 TABLET ORAL DAILY
Qty: 30 TABLET | Refills: 0 | OUTPATIENT
Start: 2024-12-31

## 2024-12-31 RX ORDER — TRAZODONE HYDROCHLORIDE 100 MG/1
TABLET ORAL
Qty: 45 TABLET | Refills: 0 | OUTPATIENT
Start: 2024-12-31

## 2024-12-31 RX ORDER — LOSARTAN POTASSIUM 100 MG/1
100 TABLET ORAL DAILY
Qty: 30 TABLET | Refills: 0 | OUTPATIENT
Start: 2024-12-31

## 2024-12-31 RX ORDER — EZETIMIBE 10 MG/1
10 TABLET ORAL DAILY
Qty: 30 TABLET | Refills: 0 | OUTPATIENT
Start: 2024-12-31

## 2025-01-07 ENCOUNTER — TELEPHONE (OUTPATIENT)
Dept: ONCOLOGY | Age: 80
End: 2025-01-07

## 2025-01-07 DIAGNOSIS — D47.2 MONOCLONAL GAMMOPATHY: Primary | ICD-10-CM

## 2025-01-07 RX ORDER — VERAPAMIL HYDROCHLORIDE 80 MG/1
80 TABLET ORAL 2 TIMES DAILY
Qty: 60 TABLET | Refills: 0 | OUTPATIENT
Start: 2025-01-07

## 2025-01-14 ENCOUNTER — TELEPHONE (OUTPATIENT)
Dept: ONCOLOGY | Age: 80
End: 2025-01-14

## 2025-01-16 ENCOUNTER — LAB SERVICES (OUTPATIENT)
Dept: LAB | Age: 80
End: 2025-01-16

## 2025-01-16 DIAGNOSIS — D47.2 MONOCLONAL GAMMOPATHY: ICD-10-CM

## 2025-01-16 LAB
BASOPHILS # BLD: 0.1 K/MCL (ref 0–0.3)
BASOPHILS NFR BLD: 1 %
DEPRECATED RDW RBC: 47.3 FL (ref 39–50)
EOSINOPHIL # BLD: 0.6 K/MCL (ref 0–0.5)
EOSINOPHIL NFR BLD: 7 %
ERYTHROCYTE [DISTWIDTH] IN BLOOD: 13.5 % (ref 11–15)
HCT VFR BLD CALC: 34.5 % (ref 36–46.5)
HGB BLD-MCNC: 11.3 G/DL (ref 12–15.5)
IMM GRANULOCYTES # BLD AUTO: 0 K/MCL (ref 0–0.2)
IMM GRANULOCYTES # BLD: 0 %
LYMPHOCYTES # BLD: 2.6 K/MCL (ref 1–4)
LYMPHOCYTES NFR BLD: 29 %
MCH RBC QN AUTO: 31.1 PG (ref 26–34)
MCHC RBC AUTO-ENTMCNC: 32.8 G/DL (ref 32–36.5)
MCV RBC AUTO: 95 FL (ref 78–100)
MONOCYTES # BLD: 0.7 K/MCL (ref 0.3–0.9)
MONOCYTES NFR BLD: 8 %
NEUTROPHILS # BLD: 4.8 K/MCL (ref 1.8–7.7)
NEUTROPHILS NFR BLD: 55 %
NRBC BLD MANUAL-RTO: 0 /100 WBC
PLATELET # BLD AUTO: 315 K/MCL (ref 140–450)
RBC # BLD: 3.63 MIL/MCL (ref 4–5.2)
WBC # BLD: 8.9 K/MCL (ref 4.2–11)

## 2025-01-16 PROCEDURE — 82784 ASSAY IGA/IGD/IGG/IGM EACH: CPT | Performed by: CLINICAL MEDICAL LABORATORY

## 2025-01-16 PROCEDURE — 86334 IMMUNOFIX E-PHORESIS SERUM: CPT | Performed by: CLINICAL MEDICAL LABORATORY

## 2025-01-16 PROCEDURE — 84165 PROTEIN E-PHORESIS SERUM: CPT | Performed by: CLINICAL MEDICAL LABORATORY

## 2025-01-16 PROCEDURE — 80053 COMPREHEN METABOLIC PANEL: CPT | Performed by: CLINICAL MEDICAL LABORATORY

## 2025-01-16 PROCEDURE — 85025 COMPLETE CBC W/AUTO DIFF WBC: CPT | Performed by: CLINICAL MEDICAL LABORATORY

## 2025-01-16 PROCEDURE — 36415 COLL VENOUS BLD VENIPUNCTURE: CPT | Performed by: INTERNAL MEDICINE

## 2025-01-16 PROCEDURE — 83521 IG LIGHT CHAINS FREE EACH: CPT | Performed by: CLINICAL MEDICAL LABORATORY

## 2025-01-17 LAB
ALBUMIN SERPL ELPH-MCNC: 4.2 G/DL (ref 3.5–4.9)
ALBUMIN SERPL-MCNC: 3.9 G/DL (ref 3.4–5)
ALBUMIN/GLOB SERPL: 0.9 {RATIO} (ref 1–2.4)
ALP SERPL-CCNC: 102 UNITS/L (ref 45–117)
ALPHA1 GLOB MFR SERPL ELPH: 0.3 G/DL (ref 0.2–0.4)
ALPHA2 GLOB SERPL ELPH-MCNC: 1 G/DL (ref 0.5–0.9)
ALT SERPL-CCNC: 21 UNITS/L
ANION GAP SERPL CALC-SCNC: 10 MMOL/L (ref 7–19)
AST SERPL-CCNC: 23 UNITS/L
B-GLOBULIN SERPL ELPH-MCNC: 0.7 G/DL (ref 0.7–1.2)
BILIRUB SERPL-MCNC: 0.2 MG/DL (ref 0.2–1)
BUN SERPL-MCNC: 21 MG/DL (ref 6–20)
BUN/CREAT SERPL: 19 (ref 7–25)
CALCIUM SERPL-MCNC: 9.5 MG/DL (ref 8.4–10.2)
CHLORIDE SERPL-SCNC: 106 MMOL/L (ref 97–110)
CO2 SERPL-SCNC: 26 MMOL/L (ref 21–32)
CREAT SERPL-MCNC: 1.13 MG/DL (ref 0.51–0.95)
EGFRCR SERPLBLD CKD-EPI 2021: 49 ML/MIN/{1.73_M2}
FASTING DURATION TIME PATIENT: 2 HOURS (ref 0–999)
GAMMA GLOB SERPL ELPH-MCNC: 1.9 G/DL (ref 0.7–1.7)
GLOBULIN SER-MCNC: 3.9 G/DL (ref 2.1–4.2)
GLOBULIN SER-MCNC: 4.3 G/DL (ref 2–4)
GLUCOSE SERPL-MCNC: 85 MG/DL (ref 70–99)
IGA SERPL-MCNC: 19 MG/DL (ref 70–400)
IGG SERPL-MCNC: 2120 MG/DL (ref 700–1600)
IGM SERPL-MCNC: 33 MG/DL (ref 40–230)
KAPPA LC FREE SER NEPH-MCNC: 1.08 MG/DL (ref 0.33–1.94)
KAPPA LC/LAMBDA SER: 0.21 {RATIO} (ref 0.26–1.65)
LAMBDA LC FREE SER NEPH-MCNC: 5.22 MG/DL (ref 0.57–2.63)
M PROTEIN 1 SERPL ELPH-MCNC: 1.7 G/DL
PATH INTERP BLD-IMP: ABNORMAL
PATH INTERP SPEC-IMP: ABNORMAL
POTASSIUM SERPL-SCNC: 4.5 MMOL/L (ref 3.4–5.1)
PROT SERPL-MCNC: 8.1 G/DL (ref 6.4–8.2)
PROT SERPL-MCNC: 8.2 G/DL (ref 6.4–8.2)
SODIUM SERPL-SCNC: 137 MMOL/L (ref 135–145)

## 2025-01-20 ENCOUNTER — TELEPHONE (OUTPATIENT)
Dept: ONCOLOGY | Age: 80
End: 2025-01-20

## 2025-01-24 ENCOUNTER — APPOINTMENT (OUTPATIENT)
Dept: ONCOLOGY | Age: 80
End: 2025-01-24
Attending: INTERNAL MEDICINE

## 2025-02-12 ENCOUNTER — LAB SERVICES (OUTPATIENT)
Dept: ONCOLOGY | Age: 80
End: 2025-02-12
Attending: INTERNAL MEDICINE

## 2025-02-12 ENCOUNTER — E-ADVICE (OUTPATIENT)
Dept: ONCOLOGY | Age: 80
End: 2025-02-12

## 2025-02-12 ENCOUNTER — OFFICE VISIT (OUTPATIENT)
Dept: ONCOLOGY | Age: 80
End: 2025-02-12
Attending: INTERNAL MEDICINE

## 2025-02-12 VITALS
OXYGEN SATURATION: 95 % | WEIGHT: 134.92 LBS | TEMPERATURE: 97.6 F | BODY MASS INDEX: 22.48 KG/M2 | HEART RATE: 74 BPM | HEIGHT: 65 IN

## 2025-02-12 DIAGNOSIS — R77.9 ABNORMALITY OF PLASMA PROTEIN, UNSPECIFIED: ICD-10-CM

## 2025-02-12 DIAGNOSIS — D47.2 MONOCLONAL GAMMOPATHY: Primary | ICD-10-CM

## 2025-02-12 DIAGNOSIS — D47.2 MONOCLONAL GAMMOPATHY: ICD-10-CM

## 2025-02-12 DIAGNOSIS — J32.0 MAXILLARY SINUSITIS, UNSPECIFIED CHRONICITY: ICD-10-CM

## 2025-02-12 LAB
ALBUMIN SERPL-MCNC: 3.7 G/DL (ref 3.4–5)
ALBUMIN/GLOB SERPL: 0.8 {RATIO} (ref 1–2.4)
ALP SERPL-CCNC: 97 UNITS/L (ref 45–117)
ALT SERPL-CCNC: 28 UNITS/L
ANION GAP SERPL CALC-SCNC: 9 MMOL/L (ref 7–19)
AST SERPL-CCNC: 26 UNITS/L
BASOPHILS # BLD: 0.1 K/MCL (ref 0–0.3)
BASOPHILS NFR BLD: 1 %
BILIRUB SERPL-MCNC: 0.2 MG/DL (ref 0.2–1)
BUN SERPL-MCNC: 12 MG/DL (ref 6–20)
BUN/CREAT SERPL: 12 (ref 7–25)
CALCIUM SERPL-MCNC: 9.9 MG/DL (ref 8.4–10.2)
CHLORIDE SERPL-SCNC: 105 MMOL/L (ref 97–110)
CO2 SERPL-SCNC: 27 MMOL/L (ref 21–32)
CREAT SERPL-MCNC: 0.98 MG/DL (ref 0.51–0.95)
DEPRECATED RDW RBC: 46.8 FL (ref 39–50)
EGFRCR SERPLBLD CKD-EPI 2021: 59 ML/MIN/{1.73_M2}
EOSINOPHIL # BLD: 0.4 K/MCL (ref 0–0.5)
EOSINOPHIL NFR BLD: 6 %
ERYTHROCYTE [DISTWIDTH] IN BLOOD: 13.9 % (ref 11–15)
FASTING DURATION TIME PATIENT: ABNORMAL H
GLOBULIN SER-MCNC: 4.4 G/DL (ref 2–4)
GLUCOSE SERPL-MCNC: 93 MG/DL (ref 70–99)
HCT VFR BLD CALC: 35 % (ref 36–46.5)
HGB BLD-MCNC: 11.3 G/DL (ref 12–15.5)
IMM GRANULOCYTES # BLD AUTO: 0 K/MCL (ref 0–0.2)
IMM GRANULOCYTES # BLD: 0 %
LYMPHOCYTES # BLD: 1.8 K/MCL (ref 1–4)
LYMPHOCYTES NFR BLD: 26 %
MAGNESIUM SERPL-MCNC: 2 MG/DL (ref 1.7–2.4)
MCH RBC QN AUTO: 30.2 PG (ref 26–34)
MCHC RBC AUTO-ENTMCNC: 32.3 G/DL (ref 32–36.5)
MCV RBC AUTO: 93.6 FL (ref 78–100)
MONOCYTES # BLD: 0.6 K/MCL (ref 0.3–0.9)
MONOCYTES NFR BLD: 9 %
NEUTROPHILS # BLD: 4 K/MCL (ref 1.8–7.7)
NEUTROPHILS NFR BLD: 58 %
NRBC BLD MANUAL-RTO: 0 /100 WBC
PLATELET # BLD AUTO: 282 K/MCL (ref 140–450)
POTASSIUM SERPL-SCNC: 4.1 MMOL/L (ref 3.4–5.1)
PROT SERPL-MCNC: 8.1 G/DL (ref 6.4–8.2)
RAINBOW EXTRA TUBES HOLD SPECIMEN: NORMAL
RAINBOW EXTRA TUBES HOLD SPECIMEN: NORMAL
RBC # BLD: 3.74 MIL/MCL (ref 4–5.2)
SODIUM SERPL-SCNC: 137 MMOL/L (ref 135–145)
URATE SERPL-MCNC: 3.5 MG/DL (ref 2.6–5.9)
WBC # BLD: 7 K/MCL (ref 4.2–11)

## 2025-02-12 PROCEDURE — 83735 ASSAY OF MAGNESIUM: CPT

## 2025-02-12 PROCEDURE — 85025 COMPLETE CBC W/AUTO DIFF WBC: CPT

## 2025-02-12 PROCEDURE — 80053 COMPREHEN METABOLIC PANEL: CPT

## 2025-02-12 PROCEDURE — 36415 COLL VENOUS BLD VENIPUNCTURE: CPT

## 2025-02-12 PROCEDURE — 84550 ASSAY OF BLOOD/URIC ACID: CPT

## 2025-02-12 PROCEDURE — 83521 IG LIGHT CHAINS FREE EACH: CPT

## 2025-02-12 PROCEDURE — 82232 ASSAY OF BETA-2 PROTEIN: CPT

## 2025-02-12 PROCEDURE — 99211 OFF/OP EST MAY X REQ PHY/QHP: CPT

## 2025-02-12 RX ORDER — LEVOFLOXACIN 250 MG/1
250 TABLET, FILM COATED ORAL DAILY
Qty: 14 TABLET | Refills: 0 | Status: SHIPPED | OUTPATIENT
Start: 2025-02-12

## 2025-02-12 RX ORDER — ALBUTEROL SULFATE 90 UG/1
2 INHALANT RESPIRATORY (INHALATION) EVERY 6 HOURS PRN
COMMUNITY

## 2025-02-12 RX ORDER — AZELASTINE HCL 205.5 UG/1
1 SPRAY NASAL 2 TIMES DAILY
COMMUNITY

## 2025-02-12 RX ORDER — LORAZEPAM 1 MG/1
TABLET ORAL
Qty: 1 TABLET | Refills: 0 | Status: CANCELLED | OUTPATIENT
Start: 2025-02-12

## 2025-02-12 RX ORDER — ALPRAZOLAM 0.25 MG/1
TABLET ORAL
COMMUNITY
Start: 2025-02-11

## 2025-02-12 ASSESSMENT — PAIN SCALES - GENERAL: PAINLEVEL: 0

## 2025-02-13 LAB
ALBUMIN SERPL ELPH-MCNC: 4.2 G/DL (ref 3.5–4.9)
ALPHA 1: 0.3 G/DL (ref 0.2–0.4)
ALPHA2 GLOB SERPL ELPH-MCNC: 0.8 G/DL (ref 0.5–0.9)
B-GLOBULIN SERPL ELPH-MCNC: 0.7 G/DL (ref 0.7–1.2)
B2 MICROGLOB SERPL-MCNC: 3.1 MG/L (ref 1.2–3.5)
GAMMA GLOB SERPL ELPH-MCNC: 1.8 G/DL (ref 0.7–1.7)
GLOBULIN SER-MCNC: 3.6 G/DL (ref 2.1–4.2)
IGA SERPL-MCNC: 21 MG/DL (ref 70–400)
IGG SERPL-MCNC: 2020 MG/DL (ref 700–1600)
IGM SERPL-MCNC: 40 MG/DL (ref 40–230)
KAPPA LC FREE SER NEPH-MCNC: 1.09 MG/DL (ref 0.33–1.94)
KAPPA LC/LAMBDA SER: 0.21 {RATIO} (ref 0.26–1.65)
LAMBDA LC FREE SER NEPH-MCNC: 5.19 MG/DL (ref 0.57–2.63)
M PROTEIN 1 SERPL ELPH-MCNC: 1.7 G/DL
PATH INTERP BLD-IMP: ABNORMAL
PATH INTERP SPEC-IMP: ABNORMAL
PATH REV BLD -IMP: YES
PATH REV: ABNORMAL
PROT SERPL-MCNC: 7.8 G/DL (ref 6.4–8.2)

## 2025-02-14 ENCOUNTER — APPOINTMENT (OUTPATIENT)
Dept: LAB | Age: 80
End: 2025-02-14

## 2025-02-14 DIAGNOSIS — D47.2 MONOCLONAL GAMMOPATHY: Primary | ICD-10-CM

## 2025-02-19 RX ORDER — LORAZEPAM 1 MG/1
TABLET ORAL
Qty: 1 TABLET | Refills: 0 | Status: SHIPPED | OUTPATIENT
Start: 2025-02-19

## 2025-03-03 ENCOUNTER — TELEPHONE (OUTPATIENT)
Dept: ONCOLOGY | Age: 80
End: 2025-03-03

## 2025-03-04 RX ORDER — VIBEGRON 75 MG/1
1 TABLET, FILM COATED ORAL DAILY
Qty: 90 TABLET | Refills: 0 | OUTPATIENT
Start: 2025-03-04

## 2025-03-05 ENCOUNTER — LAB SERVICES (OUTPATIENT)
Dept: LAB | Age: 80
End: 2025-03-05

## 2025-03-05 ENCOUNTER — HOSPITAL ENCOUNTER (OUTPATIENT)
Dept: CT IMAGING | Age: 80
Discharge: HOME | End: 2025-03-05
Attending: STUDENT IN AN ORGANIZED HEALTH CARE EDUCATION/TRAINING PROGRAM

## 2025-03-05 ENCOUNTER — APPOINTMENT (OUTPATIENT)
Dept: PET IMAGING | Age: 80
End: 2025-03-05
Attending: INTERNAL MEDICINE

## 2025-03-05 DIAGNOSIS — J32.0 MAXILLARY SINUSITIS, UNSPECIFIED CHRONICITY: ICD-10-CM

## 2025-03-05 DIAGNOSIS — D47.2 MONOCLONAL GAMMOPATHY: ICD-10-CM

## 2025-03-05 LAB
ALBUMIN SERPL-MCNC: 3.4 G/DL (ref 3.4–5)
ALBUMIN/GLOB SERPL: 0.8 {RATIO} (ref 1–2.4)
ALP SERPL-CCNC: 89 UNITS/L (ref 45–117)
ALT SERPL-CCNC: 21 UNITS/L
ANION GAP SERPL CALC-SCNC: 15 MMOL/L (ref 7–19)
AST SERPL-CCNC: 21 UNITS/L
BASOPHILS # BLD: 0.1 K/MCL (ref 0–0.3)
BASOPHILS NFR BLD: 1 %
BILIRUB SERPL-MCNC: 0.3 MG/DL (ref 0.2–1)
BUN SERPL-MCNC: 14 MG/DL (ref 6–20)
BUN/CREAT SERPL: 16 (ref 7–25)
CALCIUM SERPL-MCNC: 9.4 MG/DL (ref 8.4–10.2)
CHLORIDE SERPL-SCNC: 99 MMOL/L (ref 97–110)
CO2 SERPL-SCNC: 23 MMOL/L (ref 21–32)
CREAT SERPL-MCNC: 0.88 MG/DL (ref 0.51–0.95)
DEPRECATED RDW RBC: 45.1 FL (ref 39–50)
EGFRCR SERPLBLD CKD-EPI 2021: 67 ML/MIN/{1.73_M2}
EOSINOPHIL # BLD: 0.3 K/MCL (ref 0–0.5)
EOSINOPHIL NFR BLD: 4 %
ERYTHROCYTE [DISTWIDTH] IN BLOOD: 13.6 % (ref 11–15)
FASTING DURATION TIME PATIENT: ABNORMAL H
GLOBULIN SER-MCNC: 4.3 G/DL (ref 2–4)
GLUCOSE SERPL-MCNC: 96 MG/DL (ref 70–99)
HCT VFR BLD CALC: 32.4 % (ref 36–46.5)
HGB BLD-MCNC: 10.7 G/DL (ref 12–15.5)
IMM GRANULOCYTES # BLD AUTO: 0 K/MCL (ref 0–0.2)
IMM GRANULOCYTES # BLD: 0 %
LYMPHOCYTES # BLD: 1.3 K/MCL (ref 1–4)
LYMPHOCYTES NFR BLD: 16 %
MCH RBC QN AUTO: 29.9 PG (ref 26–34)
MCHC RBC AUTO-ENTMCNC: 33 G/DL (ref 32–36.5)
MCV RBC AUTO: 90.5 FL (ref 78–100)
MONOCYTES # BLD: 0.5 K/MCL (ref 0.3–0.9)
MONOCYTES NFR BLD: 6 %
NEUTROPHILS # BLD: 5.7 K/MCL (ref 1.8–7.7)
NEUTROPHILS NFR BLD: 73 %
NRBC BLD MANUAL-RTO: 0 /100 WBC
PLATELET # BLD AUTO: 355 K/MCL (ref 140–450)
POTASSIUM SERPL-SCNC: 4.3 MMOL/L (ref 3.4–5.1)
PROT SERPL-MCNC: 7.7 G/DL (ref 6.4–8.2)
RBC # BLD: 3.58 MIL/MCL (ref 4–5.2)
SODIUM SERPL-SCNC: 133 MMOL/L (ref 135–145)
WBC # BLD: 7.8 K/MCL (ref 4.2–11)

## 2025-03-05 PROCEDURE — 80053 COMPREHEN METABOLIC PANEL: CPT | Performed by: CLINICAL MEDICAL LABORATORY

## 2025-03-05 PROCEDURE — 86334 IMMUNOFIX E-PHORESIS SERUM: CPT | Performed by: CLINICAL MEDICAL LABORATORY

## 2025-03-05 PROCEDURE — 36415 COLL VENOUS BLD VENIPUNCTURE: CPT | Performed by: INTERNAL MEDICINE

## 2025-03-05 PROCEDURE — 84165 PROTEIN E-PHORESIS SERUM: CPT | Performed by: CLINICAL MEDICAL LABORATORY

## 2025-03-05 PROCEDURE — 82784 ASSAY IGA/IGD/IGG/IGM EACH: CPT | Performed by: CLINICAL MEDICAL LABORATORY

## 2025-03-05 PROCEDURE — 85025 COMPLETE CBC W/AUTO DIFF WBC: CPT | Performed by: CLINICAL MEDICAL LABORATORY

## 2025-03-05 PROCEDURE — 70486 CT MAXILLOFACIAL W/O DYE: CPT

## 2025-03-05 PROCEDURE — 83521 IG LIGHT CHAINS FREE EACH: CPT | Performed by: CLINICAL MEDICAL LABORATORY

## 2025-03-06 LAB
ALBUMIN SERPL ELPH-MCNC: 3.8 G/DL (ref 3.5–4.9)
ALPHA1 GLOB MFR SERPL ELPH: 0.3 G/DL (ref 0.2–0.4)
ALPHA2 GLOB MFR SERPL ELPH: 1 G/DL (ref 0.5–0.9)
B-GLOBULIN SERPL ELPH-MCNC: 0.7 G/DL (ref 0.7–1.2)
GAMMA GLOB SERPL ELPH-MCNC: 1.7 G/DL (ref 0.7–1.7)
GLOBULIN SER-MCNC: 3.7 G/DL (ref 2.1–4.2)
IGA SERPL-MCNC: 20 MG/DL (ref 70–400)
IGG SERPL-MCNC: 1930 MG/DL (ref 700–1600)
IGM SERPL-MCNC: 36 MG/DL (ref 40–230)
KAPPA LC FREE SER NEPH-MCNC: 0.8 MG/DL (ref 0.33–1.94)
KAPPA LC/LAMBDA SER: 0.14 {RATIO} (ref 0.26–1.65)
LAMBDA LC FREE SER NEPH-MCNC: 5.52 MG/DL (ref 0.57–2.63)
M PROTEIN 1 SERPL ELPH-MCNC: 1.5 G/DL
PATH INTERP BLD-IMP: ABNORMAL
PATH INTERP SPEC-IMP: ABNORMAL
PROT SERPL-MCNC: 7.5 G/DL (ref 6.4–8.2)

## 2025-03-07 ENCOUNTER — TELEPHONE (OUTPATIENT)
Dept: ONCOLOGY | Age: 80
End: 2025-03-07

## 2025-03-07 DIAGNOSIS — D47.2 MONOCLONAL GAMMOPATHY: Primary | ICD-10-CM

## 2025-03-07 DIAGNOSIS — R77.9 ABNORMALITY OF PLASMA PROTEIN, UNSPECIFIED: ICD-10-CM

## 2025-03-10 ENCOUNTER — MOBILE (OUTPATIENT)
Dept: ONCOLOGY | Age: 80
End: 2025-03-10

## 2025-03-11 RX ORDER — ONDANSETRON 8 MG/1
8 TABLET, FILM COATED ORAL EVERY 8 HOURS PRN
Qty: 20 TABLET | Refills: 0 | Status: SHIPPED | OUTPATIENT
Start: 2025-03-11 | End: 2025-03-18

## 2025-03-12 ENCOUNTER — APPOINTMENT (OUTPATIENT)
Dept: ONCOLOGY | Age: 80
End: 2025-03-12
Attending: INTERNAL MEDICINE

## 2025-03-17 ENCOUNTER — TELEPHONE (OUTPATIENT)
Dept: ONCOLOGY | Age: 80
End: 2025-03-17

## 2025-03-18 ENCOUNTER — TELEPHONE (OUTPATIENT)
Dept: OUTPATIENT SERVICES | Age: 80
End: 2025-03-18

## 2025-03-18 ENCOUNTER — TELEPHONE (OUTPATIENT)
Dept: ONCOLOGY | Age: 80
End: 2025-03-18

## 2025-03-18 DIAGNOSIS — D47.2 MONOCLONAL GAMMOPATHY: ICD-10-CM

## 2025-03-18 DIAGNOSIS — D64.9 ANEMIA, UNSPECIFIED TYPE: ICD-10-CM

## 2025-03-18 DIAGNOSIS — D47.2 MGUS (MONOCLONAL GAMMOPATHY OF UNKNOWN SIGNIFICANCE): Primary | ICD-10-CM

## 2025-03-18 RX ORDER — ONDANSETRON 8 MG/1
TABLET, FILM COATED ORAL
Qty: 20 TABLET | Refills: 0 | Status: SHIPPED | OUTPATIENT
Start: 2025-03-18

## 2025-03-19 ENCOUNTER — APPOINTMENT (OUTPATIENT)
Dept: OUTPATIENT SERVICES | Age: 80
End: 2025-03-19
Attending: INTERNAL MEDICINE

## 2025-03-24 ENCOUNTER — TELEPHONE (OUTPATIENT)
Dept: PREADMISSION TESTING | Age: 80
End: 2025-03-24

## 2025-03-24 RX ORDER — IPRATROPIUM BROMIDE AND ALBUTEROL SULFATE 2.5; .5 MG/3ML; MG/3ML
3 SOLUTION RESPIRATORY (INHALATION) EVERY 4 HOURS PRN
COMMUNITY
Start: 2025-02-20

## 2025-03-24 RX ORDER — ALPRAZOLAM 0.5 MG
0.5 TABLET ORAL NIGHTLY
COMMUNITY

## 2025-03-24 RX ORDER — MIRTAZAPINE 15 MG/1
15 TABLET, FILM COATED ORAL NIGHTLY
COMMUNITY
Start: 2025-02-23

## 2025-03-24 RX ORDER — GUAIFENESIN 200 MG/1
400 TABLET ORAL EVERY 8 HOURS PRN
COMMUNITY

## 2025-03-24 RX ORDER — MONTELUKAST SODIUM 10 MG/1
10 TABLET ORAL NIGHTLY
COMMUNITY
Start: 2025-03-05

## 2025-03-24 RX ORDER — TRAMADOL HYDROCHLORIDE 50 MG/1
25 TABLET ORAL EVERY 8 HOURS PRN
COMMUNITY
Start: 2025-03-17

## 2025-03-24 RX ORDER — HYDROCODONE BITARTRATE AND ACETAMINOPHEN 5; 325 MG/1; MG/1
1 TABLET ORAL 2 TIMES DAILY PRN
COMMUNITY

## 2025-03-24 RX ORDER — CETIRIZINE HYDROCHLORIDE 10 MG/1
1 TABLET ORAL DAILY
COMMUNITY

## 2025-03-24 RX ORDER — RIVASTIGMINE TARTRATE 3 MG/1
3 CAPSULE ORAL DAILY
COMMUNITY
Start: 2025-03-14

## 2025-03-24 ASSESSMENT — ACTIVITIES OF DAILY LIVING (ADL)
DESCRIBE HOW PAIN IMPACTS YOUR LIFE: NONE/CAN MANAGE
MOBILITY_ASSIST_DEVICES: FOUR WHEEL WALKER
RECENT_DECLINE_ADL: NO
SENSORY_SUPPORT_DEVICES: EYEGLASSES;HEARING AIDS
ADL_SHORT_OF_BREATH: NO
ADL_SCORE: 12
NEEDS_ASSIST: NO
HISTORY OF FALLING IN THE LAST YEAR (PRIOR TO ADMISSION): NO
ADL_BEFORE_ADMISSION: INDEPENDENT
CHRONIC_PAIN_PRESENT: YES, CHRONIC

## 2025-03-26 ENCOUNTER — TELEPHONE (OUTPATIENT)
Dept: PREADMISSION TESTING | Age: 80
End: 2025-03-26

## 2025-03-27 ENCOUNTER — HOSPITAL ENCOUNTER (OUTPATIENT)
Dept: NUCLEAR MEDICINE | Age: 80
Discharge: HOME | End: 2025-03-27
Attending: INTERNAL MEDICINE

## 2025-04-01 ENCOUNTER — APPOINTMENT (OUTPATIENT)
Dept: ONCOLOGY | Age: 80
End: 2025-04-01
Attending: INTERNAL MEDICINE

## 2025-04-07 ENCOUNTER — LAB SERVICES (OUTPATIENT)
Dept: LAB | Age: 80
End: 2025-04-07

## 2025-04-07 DIAGNOSIS — D64.9 ANEMIA, UNSPECIFIED: Primary | ICD-10-CM

## 2025-04-07 PROCEDURE — 85025 COMPLETE CBC W/AUTO DIFF WBC: CPT | Performed by: CLINICAL MEDICAL LABORATORY

## 2025-04-07 PROCEDURE — 83550 IRON BINDING TEST: CPT | Performed by: CLINICAL MEDICAL LABORATORY

## 2025-04-07 PROCEDURE — 82728 ASSAY OF FERRITIN: CPT | Performed by: CLINICAL MEDICAL LABORATORY

## 2025-04-07 PROCEDURE — 83540 ASSAY OF IRON: CPT | Performed by: CLINICAL MEDICAL LABORATORY

## 2025-04-07 PROCEDURE — 36415 COLL VENOUS BLD VENIPUNCTURE: CPT | Performed by: INTERNAL MEDICINE

## 2025-04-08 LAB
BASOPHILS # BLD: 0.1 K/MCL (ref 0–0.3)
BASOPHILS NFR BLD: 1 %
DEPRECATED RDW RBC: 49.3 FL (ref 39–50)
EOSINOPHIL # BLD: 0.3 K/MCL (ref 0–0.5)
EOSINOPHIL NFR BLD: 4 %
ERYTHROCYTE [DISTWIDTH] IN BLOOD: 14.6 % (ref 11–15)
FERRITIN SERPL-MCNC: 62 NG/ML (ref 8–252)
HCT VFR BLD CALC: 34.4 % (ref 36–46.5)
HGB BLD-MCNC: 11 G/DL (ref 12–15.5)
IMM GRANULOCYTES # BLD AUTO: 0 K/MCL (ref 0–0.2)
IMM GRANULOCYTES # BLD: 0 %
IRON SATN MFR SERPL: 15 % (ref 15–45)
IRON SERPL-MCNC: 48 MCG/DL (ref 50–170)
LYMPHOCYTES # BLD: 1.6 K/MCL (ref 1–4)
LYMPHOCYTES NFR BLD: 20 %
MCH RBC QN AUTO: 29.4 PG (ref 26–34)
MCHC RBC AUTO-ENTMCNC: 32 G/DL (ref 32–36.5)
MCV RBC AUTO: 92 FL (ref 78–100)
MONOCYTES # BLD: 0.5 K/MCL (ref 0.3–0.9)
MONOCYTES NFR BLD: 6 %
NEUTROPHILS # BLD: 5.2 K/MCL (ref 1.8–7.7)
NEUTROPHILS NFR BLD: 69 %
NRBC BLD MANUAL-RTO: 0 /100 WBC
PLATELET # BLD AUTO: 309 K/MCL (ref 140–450)
RBC # BLD: 3.74 MIL/MCL (ref 4–5.2)
TIBC SERPL-MCNC: 320 MCG/DL (ref 250–450)
WBC # BLD: 7.7 K/MCL (ref 4.2–11)

## 2025-05-02 RX ORDER — VIBEGRON 75 MG/1
TABLET, FILM COATED ORAL
Qty: 90 TABLET | Refills: 0 | OUTPATIENT
Start: 2025-05-02

## 2025-06-24 DIAGNOSIS — K58.9 IRRITABLE BOWEL SYNDROME, UNSPECIFIED TYPE: ICD-10-CM

## 2025-06-24 RX ORDER — HYOSCYAMINE SULFATE 0.12 MG/1
TABLET SUBLINGUAL
Qty: 30 TABLET | Refills: 0 | OUTPATIENT
Start: 2025-06-24

## 2025-07-15 RX ORDER — ACETAMINOPHEN 325 MG/1
TABLET ORAL
Qty: 60 TABLET | Refills: 4 | OUTPATIENT
Start: 2025-07-15